# Patient Record
Sex: FEMALE | Race: WHITE | NOT HISPANIC OR LATINO | Employment: PART TIME | RURAL
[De-identification: names, ages, dates, MRNs, and addresses within clinical notes are randomized per-mention and may not be internally consistent; named-entity substitution may affect disease eponyms.]

---

## 2019-05-17 ENCOUNTER — OFFICE VISIT (OUTPATIENT)
Dept: FAMILY MEDICINE CLINIC | Facility: CLINIC | Age: 29
End: 2019-05-17

## 2019-05-17 VITALS
OXYGEN SATURATION: 100 % | DIASTOLIC BLOOD PRESSURE: 86 MMHG | WEIGHT: 222.4 LBS | BODY MASS INDEX: 35.74 KG/M2 | HEIGHT: 66 IN | SYSTOLIC BLOOD PRESSURE: 120 MMHG | RESPIRATION RATE: 18 BRPM | HEART RATE: 73 BPM

## 2019-05-17 DIAGNOSIS — N92.6 MISSED PERIOD: ICD-10-CM

## 2019-05-17 DIAGNOSIS — R10.2 ACUTE SUPRAPUBIC PAIN: Primary | ICD-10-CM

## 2019-05-17 DIAGNOSIS — Z32.01 POSITIVE URINE PREGNANCY TEST: ICD-10-CM

## 2019-05-17 LAB
B-HCG UR QL: POSITIVE
BILIRUB BLD-MCNC: ABNORMAL MG/DL
CLARITY, POC: CLEAR
COLOR UR: ABNORMAL
GLUCOSE UR STRIP-MCNC: NEGATIVE MG/DL
INTERNAL NEGATIVE CONTROL: NEGATIVE
INTERNAL POSITIVE CONTROL: POSITIVE
KETONES UR QL: ABNORMAL
LEUKOCYTE EST, POC: NEGATIVE
Lab: ABNORMAL
NITRITE UR-MCNC: NEGATIVE MG/ML
PH UR: 6 [PH] (ref 5–8)
PROT UR STRIP-MCNC: ABNORMAL MG/DL
RBC # UR STRIP: NEGATIVE /UL
SP GR UR: 1.02 (ref 1–1.03)
UROBILINOGEN UR QL: NORMAL

## 2019-05-17 PROCEDURE — 99202 OFFICE O/P NEW SF 15 MIN: CPT | Performed by: NURSE PRACTITIONER

## 2019-05-17 PROCEDURE — 81025 URINE PREGNANCY TEST: CPT | Performed by: NURSE PRACTITIONER

## 2019-05-17 RX ORDER — ONDANSETRON 8 MG/1
8 TABLET, ORALLY DISINTEGRATING ORAL EVERY 8 HOURS PRN
Qty: 20 TABLET | Refills: 0 | Status: SHIPPED | OUTPATIENT
Start: 2019-05-17 | End: 2019-08-05

## 2019-05-17 NOTE — PROGRESS NOTES
"Subjective   Emily Dean is a 29 y.o. female.     [facility] Visit    PCP: none listed    CC: \"stomach pain/vomiting\"    Reports she had Nexplanon removed about 2 months ago and bled until 4-15-19 and hasn't had a cycle since that time.  Not preventing pregnancy since Nexplanon was removed. Denies vaginal bleeding or discharge.     Did home pregnancy test 2 days ago that was positive.  Has appt with [facility], but not until .  Would like to see [facility].        Abdominal Pain   This is a new problem. Episode onset: x 5 days. The onset quality is gradual. The problem occurs constantly. The problem has been waxing and waning (worse when vomiting present). The pain is located in the suprapubic region (more on left side). The pain is at a severity of 8/10. The quality of the pain is cramping and aching. The abdominal pain does not radiate. Associated symptoms include nausea and vomiting. Pertinent negatives include no anorexia, arthralgias, belching, constipation, diarrhea, dysuria, fever, flatus, frequency, headaches, hematochezia, hematuria, melena, myalgias or weight loss. The pain is aggravated by vomiting. The pain is relieved by vomiting. She has tried nothing for the symptoms. Prior workup: none. Her past medical history is significant for abdominal surgery ( x 3).        The following portions of the patient's history were reviewed and updated as appropriate: allergies, current medications, past medical history, past social history, past surgical history and problem list.    Review of Systems   Constitutional: Negative for appetite change, diaphoresis, fever and unexpected weight loss.   Respiratory: Negative for cough, chest tightness, shortness of breath and wheezing.    Cardiovascular: Negative for chest pain and leg swelling.   Gastrointestinal: Positive for abdominal pain, nausea and vomiting. Negative for anorexia, constipation, diarrhea, flatus, hematochezia and melena. " "  Genitourinary: Negative for difficulty urinating, dysuria, frequency, hematuria, vaginal bleeding and vaginal discharge.   Musculoskeletal: Negative for arthralgias and myalgias.   Neurological: Negative for dizziness and headache.   Hematological: Negative for adenopathy.     /86 (BP Location: Left arm, Patient Position: Sitting, Cuff Size: Adult)   Pulse 73   Resp 18   Ht 167.6 cm (66\")   Wt 101 kg (222 lb 6.4 oz)   SpO2 100%   BMI 35.90 kg/m²     Objective   Physical Exam   Constitutional: She is oriented to person, place, and time. She appears well-developed and well-nourished. No distress.   Cardiovascular: Normal rate and regular rhythm.   Pulmonary/Chest: Effort normal and breath sounds normal. She has no wheezes. She has no rales.   Abdominal: Soft. Bowel sounds are normal. She exhibits no mass. There is tenderness ( TTP over left suprapubic area). There is no rebound and no guarding.   Genitourinary:   Genitourinary Comments: No flank pain     Neurological: She is alert and oriented to person, place, and time.   Nursing note and vitals reviewed.    Recent Results (from the past 24 hour(s))   POCT pregnancy, urine    Collection Time: 05/17/19 12:13 PM   Result Value Ref Range    HCG, Urine, QL Positive (A) Negative    Lot Number [id number]     Internal Positive Control Positive     Internal Negative Control Negative    POC Urinalysis Dipstick    Collection Time: 05/17/19 12:14 PM   Result Value Ref Range    Color Dark Yellow Yellow, Straw, Dark Yellow, Julienne    Clarity, UA Clear Clear    Glucose, UA Negative Negative, 1000 mg/dL (3+) mg/dL    Bilirubin Small (1+) (A) Negative    Ketones, UA Trace (A) Negative    Specific Gravity  1.025 1.005 - 1.030    Blood, UA Negative Negative    pH, Urine 6.0 5.0 - 8.0    Protein, POC 30 mg/dL (A) Negative mg/dL    Urobilinogen, UA Normal Normal    Leukocytes Negative Negative    Nitrite, UA Negative Negative     No Images in the past 120 days " found..      Assessment/Plan   Emily was seen today for abdominal pain and vomiting.    Diagnoses and all orders for this visit:    Acute suprapubic pain  -     POC Urinalysis Dipstick  -     POCT pregnancy, urine  -     US Ob Transvaginal; Future  -     Ambulatory Referral to Obstetrics / Gynecology    Missed period  -     POC Urinalysis Dipstick  -     POCT pregnancy, urine    Positive urine pregnancy test  -     ondansetron ODT (ZOFRAN ODT) 8 MG disintegrating tablet; Take 1 tablet by mouth Every 8 (Eight) Hours As Needed for Nausea or Vomiting.  -     US Ob Transvaginal; Future  -     Ambulatory Referral to Obstetrics / Gynecology      Was able to get patient in for u/s today to r/o ectopic pregnancy--will contact patient with results when available    Patient already taking Prenatal vitamin and advised to continue   Push fluids, stay hydrated  Eat frequent, small meals.   Rx for Zofran ODT for n/v as needed until she sees OB  Referral to see OB at [facility]    ER if worsening abdominal pain over the weekend    Body mass index is 35.9 kg/m².--positive pregnancy test--can discuss ideal weight during pregnancy with OB    Declines RTW note at this time

## 2019-05-21 ENCOUNTER — TELEPHONE (OUTPATIENT)
Dept: FAMILY MEDICINE CLINIC | Facility: CLINIC | Age: 29
End: 2019-05-21

## 2019-05-22 ENCOUNTER — DOCUMENTATION (OUTPATIENT)
Dept: FAMILY MEDICINE CLINIC | Facility: CLINIC | Age: 29
End: 2019-05-22

## 2019-05-22 DIAGNOSIS — R10.2 ACUTE SUPRAPUBIC PAIN: ICD-10-CM

## 2019-05-22 DIAGNOSIS — Z32.01 POSITIVE URINE PREGNANCY TEST: ICD-10-CM

## 2019-05-22 NOTE — PROGRESS NOTES
US results reviewed and patient notified. Small gestational sac noted with recommended f/u in 2 weeks for continued viability and to establish gestational age.     Patient has decided she would like to see OB at [facility].  We will get appt scheduled and notify patient.

## 2019-06-05 DIAGNOSIS — Z32.01 POSITIVE URINE PREGNANCY TEST: ICD-10-CM

## 2019-06-05 DIAGNOSIS — O21.9 VOMITING OR NAUSEA OF PREGNANCY: Primary | ICD-10-CM

## 2019-06-05 RX ORDER — ONDANSETRON 8 MG/1
TABLET, ORALLY DISINTEGRATING ORAL
Qty: 20 TABLET | Refills: 0 | OUTPATIENT
Start: 2019-06-05

## 2019-06-05 RX ORDER — DIPHENHYDRAMINE HYDROCHLORIDE 25 MG/1
25 CAPSULE ORAL EVERY 8 HOURS PRN
Qty: 30 TABLET | Refills: 0 | Status: SHIPPED | OUTPATIENT
Start: 2019-06-05 | End: 2019-07-05

## 2019-06-19 ENCOUNTER — INITIAL PRENATAL (OUTPATIENT)
Dept: OBSTETRICS AND GYNECOLOGY | Facility: CLINIC | Age: 29
End: 2019-06-19

## 2019-06-19 ENCOUNTER — APPOINTMENT (OUTPATIENT)
Dept: LAB | Facility: HOSPITAL | Age: 29
End: 2019-06-19

## 2019-06-19 VITALS — SYSTOLIC BLOOD PRESSURE: 134 MMHG | DIASTOLIC BLOOD PRESSURE: 58 MMHG | BODY MASS INDEX: 35.99 KG/M2 | WEIGHT: 223 LBS

## 2019-06-19 VITALS — DIASTOLIC BLOOD PRESSURE: 76 MMHG | SYSTOLIC BLOOD PRESSURE: 118 MMHG

## 2019-06-19 DIAGNOSIS — M54.9: ICD-10-CM

## 2019-06-19 DIAGNOSIS — Z3A.10 10 WEEKS GESTATION OF PREGNANCY: Primary | ICD-10-CM

## 2019-06-19 DIAGNOSIS — Z36.87 ENCOUNTER FOR ANTENATAL SCREENING FOR UNCERTAIN DATES: ICD-10-CM

## 2019-06-19 DIAGNOSIS — O26.891: ICD-10-CM

## 2019-06-19 DIAGNOSIS — Z34.81 PRENATAL CARE, SUBSEQUENT PREGNANCY IN FIRST TRIMESTER: Primary | ICD-10-CM

## 2019-06-19 DIAGNOSIS — Z87.51 HISTORY OF PRETERM LABOR: ICD-10-CM

## 2019-06-19 DIAGNOSIS — O34.219 HISTORY OF CESAREAN DELIVERY, CURRENTLY PREGNANT: ICD-10-CM

## 2019-06-19 DIAGNOSIS — Z98.891 HISTORY OF C-SECTION: ICD-10-CM

## 2019-06-19 DIAGNOSIS — Z82.79 FAMILY HISTORY OF CONGENITAL HYDROCEPHALUS: ICD-10-CM

## 2019-06-19 DIAGNOSIS — Z87.74 HISTORY OF CONGENITAL HEART DEFECT: ICD-10-CM

## 2019-06-19 DIAGNOSIS — O09.891 HISTORY OF PRETERM DELIVERY, CURRENTLY PREGNANT IN FIRST TRIMESTER: ICD-10-CM

## 2019-06-19 DIAGNOSIS — O21.9 NAUSEA AND VOMITING DURING PREGNANCY PRIOR TO 22 WEEKS GESTATION: ICD-10-CM

## 2019-06-19 PROBLEM — R10.2 ACUTE SUPRAPUBIC PAIN: Status: RESOLVED | Noted: 2019-05-17 | Resolved: 2019-06-19

## 2019-06-19 PROBLEM — Z32.01 POSITIVE URINE PREGNANCY TEST: Status: RESOLVED | Noted: 2019-05-17 | Resolved: 2019-06-19

## 2019-06-19 LAB
ABO GROUP BLD: NORMAL
AMPHET+METHAMPHET UR QL: NEGATIVE
BARBITURATES UR QL SCN: NEGATIVE
BASOPHILS # BLD AUTO: 0.03 10*3/MM3 (ref 0–0.2)
BASOPHILS NFR BLD AUTO: 0.2 % (ref 0–1.5)
BENZODIAZ UR QL SCN: NEGATIVE
BILIRUB UR QL STRIP: NEGATIVE
BLD GP AB SCN SERPL QL: NEGATIVE
CANNABINOIDS SERPL QL: NEGATIVE
CLARITY UR: CLEAR
COCAINE UR QL: NEGATIVE
COLOR UR: YELLOW
DEPRECATED RDW RBC AUTO: 42.1 FL (ref 37–54)
EOSINOPHIL # BLD AUTO: 0.09 10*3/MM3 (ref 0–0.4)
EOSINOPHIL NFR BLD AUTO: 0.7 % (ref 0.3–6.2)
ERYTHROCYTE [DISTWIDTH] IN BLOOD BY AUTOMATED COUNT: 13.5 % (ref 12.3–15.4)
GLUCOSE UR STRIP-MCNC: NEGATIVE MG/DL
HBV SURFACE AG SERPL QL IA: NORMAL
HCT VFR BLD AUTO: 39 % (ref 34–46.6)
HCV AB SER DONR QL: NORMAL
HGB BLD-MCNC: 13.1 G/DL (ref 12–15.9)
HGB UR QL STRIP.AUTO: NEGATIVE
HIV1+2 AB SER QL: NORMAL
IMM GRANULOCYTES # BLD AUTO: 0.04 10*3/MM3 (ref 0–0.05)
IMM GRANULOCYTES NFR BLD AUTO: 0.3 % (ref 0–0.5)
KETONES UR QL STRIP: NEGATIVE
LEUKOCYTE ESTERASE UR QL STRIP.AUTO: NEGATIVE
LYMPHOCYTES # BLD AUTO: 1.96 10*3/MM3 (ref 0.7–3.1)
LYMPHOCYTES NFR BLD AUTO: 16.2 % (ref 19.6–45.3)
Lab: NORMAL
MCH RBC QN AUTO: 28.9 PG (ref 26.6–33)
MCHC RBC AUTO-ENTMCNC: 33.6 G/DL (ref 31.5–35.7)
MCV RBC AUTO: 85.9 FL (ref 79–97)
METHADONE UR QL SCN: NEGATIVE
MONOCYTES # BLD AUTO: 0.67 10*3/MM3 (ref 0.1–0.9)
MONOCYTES NFR BLD AUTO: 5.5 % (ref 5–12)
NEUTROPHILS # BLD AUTO: 9.34 10*3/MM3 (ref 1.7–7)
NEUTROPHILS NFR BLD AUTO: 77.1 % (ref 42.7–76)
NITRITE UR QL STRIP: NEGATIVE
NRBC BLD AUTO-RTO: 0 /100 WBC (ref 0–0.2)
OPIATES UR QL: NEGATIVE
OXYCODONE UR QL SCN: NEGATIVE
PH UR STRIP.AUTO: 5.5 [PH] (ref 5–8)
PLATELET # BLD AUTO: 256 10*3/MM3 (ref 140–450)
PMV BLD AUTO: 10.7 FL (ref 6–12)
PROT UR QL STRIP: NEGATIVE
RBC # BLD AUTO: 4.54 10*6/MM3 (ref 3.77–5.28)
RH BLD: POSITIVE
RUBV IGG SERPL IA-ACNC: POSITIVE
SP GR UR STRIP: 1.03 (ref 1–1.03)
UROBILINOGEN UR QL STRIP: NORMAL
WBC NRBC COR # BLD: 12.13 10*3/MM3 (ref 3.4–10.8)

## 2019-06-19 PROCEDURE — 80307 DRUG TEST PRSMV CHEM ANLYZR: CPT | Performed by: NURSE PRACTITIONER

## 2019-06-19 PROCEDURE — 86803 HEPATITIS C AB TEST: CPT | Performed by: NURSE PRACTITIONER

## 2019-06-19 PROCEDURE — 86695 HERPES SIMPLEX TYPE 1 TEST: CPT | Performed by: NURSE PRACTITIONER

## 2019-06-19 PROCEDURE — 36415 COLL VENOUS BLD VENIPUNCTURE: CPT

## 2019-06-19 PROCEDURE — 87661 TRICHOMONAS VAGINALIS AMPLIF: CPT | Performed by: NURSE PRACTITIONER

## 2019-06-19 PROCEDURE — 87086 URINE CULTURE/COLONY COUNT: CPT | Performed by: NURSE PRACTITIONER

## 2019-06-19 PROCEDURE — 99214 OFFICE O/P EST MOD 30 MIN: CPT | Performed by: NURSE PRACTITIONER

## 2019-06-19 PROCEDURE — 80081 OBSTETRIC PANEL INC HIV TSTG: CPT | Performed by: NURSE PRACTITIONER

## 2019-06-19 PROCEDURE — 86696 HERPES SIMPLEX TYPE 2 TEST: CPT | Performed by: NURSE PRACTITIONER

## 2019-06-19 PROCEDURE — 81003 URINALYSIS AUTO W/O SCOPE: CPT | Performed by: NURSE PRACTITIONER

## 2019-06-19 PROCEDURE — 86696 HERPES SIMPLEX TYPE 2 TEST: CPT

## 2019-06-19 PROCEDURE — 87491 CHLMYD TRACH DNA AMP PROBE: CPT | Performed by: NURSE PRACTITIONER

## 2019-06-19 PROCEDURE — 87591 N.GONORRHOEAE DNA AMP PROB: CPT | Performed by: NURSE PRACTITIONER

## 2019-06-19 RX ORDER — ONDANSETRON 4 MG/1
8 TABLET, FILM COATED ORAL EVERY 8 HOURS PRN
Qty: 30 TABLET | Refills: 1 | Status: SHIPPED | OUTPATIENT
Start: 2019-06-19 | End: 2019-07-05

## 2019-06-19 NOTE — PROGRESS NOTES
I spent approximately 60 minutes with the patient acquiring the health and history intake and discussing topics related to healthy lifestyle. This is her fourth pregnancy. She had a csection with her first child due to baby having hydrocephaly. The child  at 6 years of age. The other 2 csections were scheduled repeat csections. Her last baby was born at 35wks due to  labor and SROM. Release on information sheets were signed to get the reports. 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 The 1st trimester teaching was done with the patient. We discussed a healthy diet and exercise and what is recommended. She complains today of nausea, vomiting, and constipation. We discussed options. She states she had zofran but ran out of medicine. I also discussed Listeriosis and Toxoplasmosis and what fish to avoid due to high mercury levels. Informed patient not to be in hot tubs, saunas, or tanning beds. We discussed that spotting may occur after intercourse which is common, but if heavy bleeding like a period occurs to call the [facility] or hospital if clinic is closed.  I encouraged her to make an appointment with the dentist if she has not had a dental exam and cleaning in the last 6 months. I instructed the patient that alcohol, illicit drug use, and tobacco smoking should be avoided in pregnancy. The patient does not smoke but discussed the importance of avoiding second hand smoke. We discussed the hospital policy procedure if a patient has a positive urine drug screen at the time of admission to the hospital. She does not plan to breastfeed. We discussed the resources that is offered at the health departments. She is interested in WIC and HANDS. She filled out the health department referral form. I discussed lab tests will be done today. The Quad screen is discussed and informed patient it is offered at 15-20 weeks and is optional. A release is signed to  get her last pap smear from [facility].   I encouraged the patient to get the TDAP vaccine in the 3rd trimester. I told the patient that health departments are giving the TDAP vaccine to family members. She also complains of back pain. I told her about [facility]. I told her if she was interested in seeing Whitney to talk to the provider about an appointment.  All questions were answered at this time.

## 2019-06-20 LAB
BACTERIA SPEC AEROBE CULT: NO GROWTH
C TRACH RRNA CVX QL NAA+PROBE: NEGATIVE
HSV-2 IGG SUPPLEMENTAL TEST: POSITIVE
HSV1 IGG SER IA-ACNC: 4.09 INDEX (ref 0–0.9)
HSV2 IGG SER IA-ACNC: 3.62 INDEX (ref 0–0.9)
N GONORRHOEA RRNA SPEC QL NAA+PROBE: NEGATIVE
RPR SER QL: NORMAL
TRICHOMONAS VAGINALIS PCR: NEGATIVE

## 2019-06-20 NOTE — PROGRESS NOTES
CC: NOB visit, hx reviewed    Patient Active Problem List   Diagnosis   (none) - all problems resolved or deleted       HPI: Here for initial prenatal care visit.     Labs:   No results found for: HGBA1C  No results found for: GLUCOSE  Last Completed Pap Smear       Status Date      PAP SMEAR No completions recorded          Radiology: TVUS showed single intrauterine fetus, CRL 10w1d, CL 3.66 cm    Each of the above were reviewed and integrated into prenatal care plan.    P/E:  See Vitals flow sheet, manually re-checked BP in left arm    A/P: 29 y.o. #: 1, Date: , Sex: Male, Weight: 3884 g (8 lb 9 oz), GA: 40w0d, Delivery: , Unspecified, Apgar1: None, Apgar5: None, Living: , Birth Comments:  at age 6; scheduled csection due to hydrocephaly    #: 2, Date: 10/11/09, Sex: Female, Weight: 3289 g (7 lb 4 oz), GA: 40w0d, Delivery: , Unspecified, Apgar1: None, Apgar5: None, Living: Living, Birth Comments: None    #: 3, Date: 12/04/15, Sex: Female, Weight: 2608 g (5 lb 12 oz), GA: 35w0d, Delivery: , Unspecified, Apgar1: None, Apgar5: None, Living: Living, Birth Comments: labor onset, ruptured membranes, repeat csection    #: 4, Date: None, Sex: None, Weight: None, GA: 10w1d      1. Routine prenatal care   Encourage PNV   SAB precautions   NOB labs today     2.    Diagnosis Plan   1. 10 weeks gestation of pregnancy     2. Encounter for  screening for uncertain dates  US Ob Transvaginal   3. Nausea and vomiting during pregnancy prior to 22 weeks gestation  ondansetron (ZOFRAN) 4 MG tablet  Eat dry crackers before arising in am, small frequent protein filled snacks   4. History of  delivery, currently pregnant in first trimester  @ 35 weeks gestation   5. History of  delivery, currently pregnant  Desires RCS and BTL   6. History of congenital heart defect  Pt had VSD which required surgery as an infant    7. Family history of congenital hydrocephalus  Pt had  child with hydrocephaly whom  at 6 years old   8. Back pain during pregnancy in first trimester  Use good body mechanics, tylenol as needed for pain, warm compress, we can refer to PT-pt declines      RTC in 1 month for HENRIETTA serna

## 2019-06-24 DIAGNOSIS — Z36.87 ENCOUNTER FOR ANTENATAL SCREENING FOR UNCERTAIN DATES: ICD-10-CM

## 2019-07-02 DIAGNOSIS — O21.9 NAUSEA AND VOMITING DURING PREGNANCY PRIOR TO 22 WEEKS GESTATION: ICD-10-CM

## 2019-07-02 RX ORDER — ONDANSETRON 4 MG/1
TABLET, ORALLY DISINTEGRATING ORAL
Qty: 30 TABLET | OUTPATIENT
Start: 2019-07-02

## 2019-07-03 DIAGNOSIS — O20.9 BLEEDING IN EARLY PREGNANCY: Primary | ICD-10-CM

## 2019-07-05 ENCOUNTER — HOSPITAL ENCOUNTER (OUTPATIENT)
Dept: ULTRASOUND IMAGING | Facility: HOSPITAL | Age: 29
Discharge: HOME | End: 2019-07-05
Admitting: NURSE PRACTITIONER

## 2019-07-05 ENCOUNTER — APPOINTMENT (OUTPATIENT)
Dept: LAB | Facility: HOSPITAL | Age: 29
End: 2019-07-05

## 2019-07-05 ENCOUNTER — ROUTINE PRENATAL (OUTPATIENT)
Dept: OBSTETRICS AND GYNECOLOGY | Facility: CLINIC | Age: 29
End: 2019-07-05

## 2019-07-05 VITALS — DIASTOLIC BLOOD PRESSURE: 84 MMHG | BODY MASS INDEX: 36.28 KG/M2 | WEIGHT: 224.8 LBS | SYSTOLIC BLOOD PRESSURE: 118 MMHG

## 2019-07-05 DIAGNOSIS — O09.891 HISTORY OF PRETERM DELIVERY, CURRENTLY PREGNANT IN FIRST TRIMESTER: ICD-10-CM

## 2019-07-05 DIAGNOSIS — Z98.891 HISTORY OF 3 CESAREAN SECTIONS: ICD-10-CM

## 2019-07-05 DIAGNOSIS — Z3A.12 12 WEEKS GESTATION OF PREGNANCY: Primary | ICD-10-CM

## 2019-07-05 DIAGNOSIS — O20.9 BLEEDING IN EARLY PREGNANCY: ICD-10-CM

## 2019-07-05 DIAGNOSIS — Z87.74 HISTORY OF CONGENITAL HEART DEFECT: ICD-10-CM

## 2019-07-05 LAB — HCG INTACT+B SERPL-ACNC: NORMAL MIU/ML

## 2019-07-05 PROCEDURE — 76801 OB US < 14 WKS SINGLE FETUS: CPT

## 2019-07-05 PROCEDURE — 84702 CHORIONIC GONADOTROPIN TEST: CPT | Performed by: NURSE PRACTITIONER

## 2019-07-05 PROCEDURE — 99212 OFFICE O/P EST SF 10 MIN: CPT | Performed by: NURSE PRACTITIONER

## 2019-07-05 PROCEDURE — 36415 COLL VENOUS BLD VENIPUNCTURE: CPT | Performed by: NURSE PRACTITIONER

## 2019-07-05 RX ORDER — NITROFURANTOIN 25; 75 MG/1; MG/1
100 CAPSULE ORAL 2 TIMES DAILY
COMMUNITY
End: 2019-08-05

## 2019-07-05 NOTE — PROGRESS NOTES
"CC: bleeding in early pregnancy      Patient Active Problem List   Diagnosis   (none) - all problems resolved or deleted       HPI: Fourth pregnancy, history of PPROM in 2015, infant with hydrencephaly in -lived till 6 years old.    Pt reported she had \"three holes in my heart\" which required surgery when she was an infant.  It was confirmed through past medical records that patient has ASD and VSD, but she is asymptomatic.  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      ROS: Pt reports on 19 she had thick mucus blood tinged discharge followed by bright red vaginal bleeding so she went to [facility] ED because she thought she was miscarrying.  Light vaginal bleeding occurred two days following the event, but has since subsided.     Labs:   No results found for: HGBA1C  No results found for: GLUCOSE  Last Completed Pap Smear       Status Date      PAP SMEAR No completions recorded          Radiology: Reviewed preliminary viability scan report with pt- no hemorrhages or abnormalities seen     Each of the above were reviewed and integrated into prenatal care plan.    P/E:  See Vitals flow sheet    A/P: 29 y.o. #: 1, Date: , Sex: Male, Weight: 3884 g (8 lb 9 oz), GA: 40w0d, Delivery: , Unspecified, Apgar1: None, Apgar5: None, Living: , Birth Comments:  at age 6; scheduled csection due to hydrocephaly    #: 2, Date: 10/11/09, Sex: Female, Weight: 3289 g (7 lb 4 oz), GA: 40w0d, Delivery: , Unspecified, Apgar1: None, Apgar5: None, Living: Living, Birth Comments: None    #: 3, Date: 12/04/15, Sex: Female, Weight: 2608 g (5 lb 12 oz), GA: 35w0d, Delivery: , Unspecified, Apgar1: None, Apgar5: None, Living: Living, Birth Comments: labor onset, ruptured membranes, repeat csection    #: 4, Date: None, Sex: None, Weight: None, GA:12w3d      1. Routine prenatal care   Encourage PNV   SAB precautions   Pt may experience some vaginal spotting after " intercourse     2.    Diagnosis Plan   1. 12 weeks gestation of pregnancy     2. History of 3  sections     3. History of congenital heart defect  Current vsd and asd, asymptomatic    4. History of  delivery, currently pregnant in first trimester     5. HSV 2        RCS

## 2019-08-05 ENCOUNTER — APPOINTMENT (OUTPATIENT)
Dept: LAB | Facility: HOSPITAL | Age: 29
End: 2019-08-05

## 2019-08-05 ENCOUNTER — ROUTINE PRENATAL (OUTPATIENT)
Dept: OBSTETRICS AND GYNECOLOGY | Facility: CLINIC | Age: 29
End: 2019-08-05

## 2019-08-05 VITALS — DIASTOLIC BLOOD PRESSURE: 86 MMHG | WEIGHT: 227 LBS | BODY MASS INDEX: 36.64 KG/M2 | SYSTOLIC BLOOD PRESSURE: 126 MMHG

## 2019-08-05 DIAGNOSIS — Z36.89 ENCOUNTER FOR FETAL ANATOMIC SURVEY: ICD-10-CM

## 2019-08-05 DIAGNOSIS — Z3A.16 16 WEEKS GESTATION OF PREGNANCY: Primary | ICD-10-CM

## 2019-08-05 DIAGNOSIS — O34.219 HISTORY OF CESAREAN DELIVERY, CURRENTLY PREGNANT: ICD-10-CM

## 2019-08-05 DIAGNOSIS — Z36.9 ENCOUNTER FOR ANTENATAL SCREENING: ICD-10-CM

## 2019-08-05 DIAGNOSIS — O09.892 HISTORY OF PRETERM DELIVERY, CURRENTLY PREGNANT IN SECOND TRIMESTER: ICD-10-CM

## 2019-08-05 DIAGNOSIS — Z86.19 HISTORY OF HERPES SIMPLEX INFECTION: ICD-10-CM

## 2019-08-05 LAB
BILIRUB UR QL STRIP: NEGATIVE
CANDIDA ALBICANS: NEGATIVE
CLARITY UR: ABNORMAL
COLOR UR: YELLOW
GARDNERELLA VAGINALIS: NEGATIVE
GLUCOSE UR STRIP-MCNC: NEGATIVE MG/DL
HGB UR QL STRIP.AUTO: NEGATIVE
KETONES UR QL STRIP: NEGATIVE
LEUKOCYTE ESTERASE UR QL STRIP.AUTO: NEGATIVE
NITRITE UR QL STRIP: NEGATIVE
PH UR STRIP.AUTO: 6 [PH] (ref 5–8)
PROT UR QL STRIP: NEGATIVE
SP GR UR STRIP: 1.02 (ref 1–1.03)
T VAGINALIS DNA VAG QL PROBE+SIG AMP: NEGATIVE
UROBILINOGEN UR QL STRIP: ABNORMAL

## 2019-08-05 PROCEDURE — 81003 URINALYSIS AUTO W/O SCOPE: CPT | Performed by: NURSE PRACTITIONER

## 2019-08-05 PROCEDURE — 87480 CANDIDA DNA DIR PROBE: CPT | Performed by: NURSE PRACTITIONER

## 2019-08-05 PROCEDURE — 87086 URINE CULTURE/COLONY COUNT: CPT | Performed by: NURSE PRACTITIONER

## 2019-08-05 PROCEDURE — 87660 TRICHOMONAS VAGIN DIR PROBE: CPT | Performed by: NURSE PRACTITIONER

## 2019-08-05 PROCEDURE — 87510 GARDNER VAG DNA DIR PROBE: CPT | Performed by: NURSE PRACTITIONER

## 2019-08-05 PROCEDURE — 99213 OFFICE O/P EST LOW 20 MIN: CPT | Performed by: NURSE PRACTITIONER

## 2019-08-05 NOTE — PROGRESS NOTES
CC: HENRIETTA visit, hx reviewed     HPI: 29 y.o.   GA: 16w6d JUANITA: 2020, by Ultrasound  Here for follow up prenatal care.     ROS: Doing better, starts new job at [employer] next week which will be less strenuous and stressful.  Occasional vaginal spotting. Pt denies cramping or dysuria.      Labs:   No results found for: HGBA1C  No results found for: GLUCOSE  Last Completed Pap Smear       Status Date      PAP SMEAR No completions recorded          Each of the above were reviewed and integrated into prenatal care plan.    P/E:  See Vitals flow sheet      1. Routine prenatal care   Encourage PNV   PTL precautions   Pt prefers appointments to be in [place] since its closer to home  2.    Diagnosis Plan   1. 16 weeks gestation of pregnancy     2. History of  delivery, currently pregnant  Desires RCS and BTL   3. History of herpes simplex infection  Prophylactic valtrex @ 35 weeks   4. History of  delivery, currently pregnant in second trimester  35 weeks in previous pregnancy   5. Encounter for  screening  Gardnerella vaginalis, Trichomonas vaginalis, Candida albicans, DNA - Swab, Vagina    Urinalysis without microscopic (no culture) - Urine, Clean Catch    Urine Culture - Urine, Urine, Clean Catch   6. Encounter for fetal anatomic survey  US Ob Detail Fetal Anatomy Single or First Gestation  Personal history of PDA, VSD, and ASD.  Plan on fetal echo following anatomy scan.       RTC in [place] in 3 weeks for HENRIETTA appt and anatomy scan

## 2019-08-06 LAB — BACTERIA SPEC AEROBE CULT: NO GROWTH

## 2019-08-26 ENCOUNTER — ROUTINE PRENATAL (OUTPATIENT)
Dept: OBSTETRICS AND GYNECOLOGY | Facility: CLINIC | Age: 29
End: 2019-08-26

## 2019-08-26 VITALS — WEIGHT: 230 LBS | SYSTOLIC BLOOD PRESSURE: 119 MMHG | DIASTOLIC BLOOD PRESSURE: 76 MMHG | BODY MASS INDEX: 37.12 KG/M2

## 2019-08-26 DIAGNOSIS — O99.891 MATERNAL CONGENITAL CARDIAC ANOMALY AFFECTING PREGNANCY, ANTEPARTUM, SECOND TRIMESTER: Primary | ICD-10-CM

## 2019-08-26 DIAGNOSIS — Q24.9 MATERNAL CONGENITAL CARDIAC ANOMALY AFFECTING PREGNANCY, ANTEPARTUM, SECOND TRIMESTER: Primary | ICD-10-CM

## 2019-08-26 DIAGNOSIS — IMO0002 EVALUATE ANATOMY NOT SEEN ON PRIOR SONOGRAM: ICD-10-CM

## 2019-08-26 DIAGNOSIS — O34.211 ENCOUNTER FOR MATERNAL CARE FOR LOW TRANSVERSE SCAR FROM PREVIOUS CESAREAN DELIVERY: ICD-10-CM

## 2019-08-26 DIAGNOSIS — Z82.79 FAMILY HISTORY OF CONGENITAL HYDROCEPHALUS: ICD-10-CM

## 2019-08-26 DIAGNOSIS — O43.92 DISORDER OF PLACENTA IN SECOND TRIMESTER: ICD-10-CM

## 2019-08-26 DIAGNOSIS — Z3A.19 19 WEEKS GESTATION OF PREGNANCY: ICD-10-CM

## 2019-08-26 PROCEDURE — 99214 OFFICE O/P EST MOD 30 MIN: CPT | Performed by: NURSE PRACTITIONER

## 2019-08-30 DIAGNOSIS — Z36.89 ENCOUNTER FOR FETAL ANATOMIC SURVEY: ICD-10-CM

## 2019-09-16 ENCOUNTER — ROUTINE PRENATAL (OUTPATIENT)
Dept: OBSTETRICS AND GYNECOLOGY | Facility: CLINIC | Age: 29
End: 2019-09-16

## 2019-09-16 VITALS — DIASTOLIC BLOOD PRESSURE: 78 MMHG | WEIGHT: 242 LBS | BODY MASS INDEX: 39.06 KG/M2 | SYSTOLIC BLOOD PRESSURE: 124 MMHG

## 2019-09-16 DIAGNOSIS — O34.211 ENCOUNTER FOR MATERNAL CARE FOR LOW TRANSVERSE SCAR FROM PREVIOUS CESAREAN DELIVERY: ICD-10-CM

## 2019-09-16 DIAGNOSIS — O99.891 MATERNAL CONGENITAL CARDIAC ANOMALY AFFECTING PREGNANCY, ANTEPARTUM, SECOND TRIMESTER: Primary | ICD-10-CM

## 2019-09-16 DIAGNOSIS — R00.2 HEART PALPITATIONS: ICD-10-CM

## 2019-09-16 DIAGNOSIS — Z82.79 FAMILY HISTORY OF CONGENITAL HYDROCEPHALUS: ICD-10-CM

## 2019-09-16 DIAGNOSIS — Q24.9 MATERNAL CONGENITAL CARDIAC ANOMALY AFFECTING PREGNANCY, ANTEPARTUM, SECOND TRIMESTER: Primary | ICD-10-CM

## 2019-09-16 DIAGNOSIS — I83.813 VARICOSE VEINS OF BOTH LOWER EXTREMITIES WITH PAIN: ICD-10-CM

## 2019-09-16 DIAGNOSIS — Z36.89 ENCOUNTER FOR OTHER SPECIFIED ANTENATAL SCREENING: ICD-10-CM

## 2019-09-16 DIAGNOSIS — Z3A.22 22 WEEKS GESTATION OF PREGNANCY: ICD-10-CM

## 2019-09-16 DIAGNOSIS — O43.92 DISORDER OF PLACENTA IN SECOND TRIMESTER: ICD-10-CM

## 2019-09-16 PROCEDURE — 99214 OFFICE O/P EST MOD 30 MIN: CPT | Performed by: NURSE PRACTITIONER

## 2019-09-16 RX ORDER — RANITIDINE 150 MG/1
150 TABLET ORAL NIGHTLY
Qty: 30 TABLET | Refills: 4 | Status: SHIPPED | OUTPATIENT
Start: 2019-09-16 | End: 2019-12-05

## 2019-09-16 NOTE — PROGRESS NOTES
CC: Prenatal visit; hx reviewed and updated    Emily Dean is a 29 y.o.  at 22w6d.  C/O chest pain and palpitations that wake her from sleep. Partner has noticed that it is most frequent after she has had a midnight snack but she states that the pain is severe and very scary for her due to her cardiac history. Denies dysuria, vb, LOF, decreased FM, abnormal vaginal d/c, headaches or constipation.     /78   Wt 110 kg (242 lb)   LMP 2019   BMI 39.06 kg/m²   Reviewed anatomy u/s f/u: EFW 1lb 5oz, LALA WNL. Placental lake still present, measuring 4.05 x 1.27 x 4.46cm. All suboptimal fetal anatomy was seen today and all noted to appear normal at this time. CL not measured today.     Problems (from 19 to present)     Problem Noted Resolved    Varicose veins of both lower extremities with pain 2019 by Deena Manzanares APRN No    Overview Signed 2019 11:26 AM by Deena Manzanares APRN     Encouraged compression stockings or tighter fitting leggings.  Referral to cardio.         Heart palpitations 2019 by Deena Manzanares APRN No    Overview Signed 2019 11:26 AM by Deena Manzanares APRN     H/o ASD/VSD/PDA sx as infant. Referral to cardio.         Maternal congenital cardiac anomaly affecting pregnancy, antepartum, second trimester 2019 by Deena Manzanares APRN No    Overview Addendum 2019 11:27 AM by Deena Manzanares APRN     ASD/VSD/PDA sx as infant         Disorder of placenta in second trimester 2019 by Deena Manzanares APRN No    Overview Signed 2019 12:45 PM by Deena Manzanares APRN     Placental Henson  19 measurements: 4.05 x 1.27 x 4.46cm.         Family history of congenital hydrocephalus 2019 by Deena Manzanares APRN No    Overview Addendum 2019 11:28 AM by Deena Manzanares APRN     Pt's son born in . He  at age 6.         Encounter for maternal care for low transverse scar from previous  delivery 2019 by Deena Manzanares  RISHI, CHONG No    Overview Signed 2019 12:47 PM by Deena Manzanares, APRN     x3  Plans for RLTCS with BTL               A/P: Emily Dean is a 29 y.o.  at 22w6d.  - RTC in 4 weeks  - MFM consult and fetal echo in 1 week with GERALD Zepeda.     Diagnosis Plan   1. Maternal congenital cardiac anomaly affecting pregnancy, antepartum, second trimester  Ambulatory Referral to Cardiology    US Fetal Echo    US Ob Follow Up Transabdominal Approach   2. Disorder of placenta in second trimester     3. Family history of congenital hydrocephalus  US Fetal Echo    US Ob Follow Up Transabdominal Approach   4. Encounter for maternal care for low transverse scar from previous  delivery     5. Heart palpitations  Ambulatory Referral to Cardiology    Trying to differentiate b/t palpitations and heartburn. So will add zantac at bedtime. If she is not seeing any relief after 2 weeks of use then she can stop taking it.   6. Varicose veins of both lower extremities with pain  Ambulatory Referral to Cardiology   7. Encounter for other specified  screening  CBC (No Diff)    Glucose, Post 50 Gm Glucola   8. 22 weeks gestation of pregnancy         CHONG Perez  2019  11:37 AM

## 2019-09-18 DIAGNOSIS — O99.891 MATERNAL CONGENITAL CARDIAC ANOMALY AFFECTING PREGNANCY, ANTEPARTUM, SECOND TRIMESTER: Primary | ICD-10-CM

## 2019-09-18 DIAGNOSIS — Q24.9 MATERNAL CONGENITAL CARDIAC ANOMALY AFFECTING PREGNANCY, ANTEPARTUM, SECOND TRIMESTER: Primary | ICD-10-CM

## 2019-09-23 DIAGNOSIS — Q24.9 MATERNAL CONGENITAL CARDIAC ANOMALY AFFECTING PREGNANCY, ANTEPARTUM, SECOND TRIMESTER: ICD-10-CM

## 2019-09-23 DIAGNOSIS — O99.891 MATERNAL CONGENITAL CARDIAC ANOMALY AFFECTING PREGNANCY, ANTEPARTUM, SECOND TRIMESTER: ICD-10-CM

## 2019-10-17 DIAGNOSIS — R00.2 HEART PALPITATIONS: Primary | ICD-10-CM

## 2019-10-18 ENCOUNTER — LAB (OUTPATIENT)
Dept: LAB | Facility: HOSPITAL | Age: 29
End: 2019-10-18

## 2019-10-18 ENCOUNTER — HOSPITAL ENCOUNTER (OUTPATIENT)
Dept: ULTRASOUND IMAGING | Facility: HOSPITAL | Age: 29
Discharge: HOME | End: 2019-10-18
Admitting: INTERNAL MEDICINE

## 2019-10-18 ENCOUNTER — OFFICE VISIT (OUTPATIENT)
Dept: CARDIOLOGY | Facility: CLINIC | Age: 29
End: 2019-10-18

## 2019-10-18 VITALS
HEIGHT: 66 IN | DIASTOLIC BLOOD PRESSURE: 74 MMHG | OXYGEN SATURATION: 98 % | SYSTOLIC BLOOD PRESSURE: 122 MMHG | HEART RATE: 78 BPM | BODY MASS INDEX: 38.89 KG/M2 | WEIGHT: 242 LBS

## 2019-10-18 DIAGNOSIS — M79.89 LOCALIZED SWELLING OF LOWER EXTREMITY: ICD-10-CM

## 2019-10-18 DIAGNOSIS — R00.2 HEART PALPITATIONS: Primary | ICD-10-CM

## 2019-10-18 DIAGNOSIS — Q28.9 CONGENITAL ANOMALY OF CARDIOVASCULAR SYSTEM: ICD-10-CM

## 2019-10-18 DIAGNOSIS — R00.2 HEART PALPITATIONS: ICD-10-CM

## 2019-10-18 LAB
ALBUMIN SERPL-MCNC: 3.8 G/DL (ref 3.5–5.2)
ALBUMIN/GLOB SERPL: 1.3 G/DL
ALP SERPL-CCNC: 105 U/L (ref 39–117)
ALT SERPL W P-5'-P-CCNC: 9 U/L (ref 1–33)
ANION GAP SERPL CALCULATED.3IONS-SCNC: 7.2 MMOL/L (ref 5–15)
AST SERPL-CCNC: 12 U/L (ref 1–32)
BILIRUB SERPL-MCNC: 0.3 MG/DL (ref 0.2–1.2)
BUN BLD-MCNC: 7 MG/DL (ref 6–20)
BUN/CREAT SERPL: 12.5 (ref 7–25)
CALCIUM SPEC-SCNC: 8.6 MG/DL (ref 8.6–10.5)
CHLORIDE SERPL-SCNC: 102 MMOL/L (ref 98–107)
CO2 SERPL-SCNC: 26.8 MMOL/L (ref 22–29)
CREAT BLD-MCNC: 0.56 MG/DL (ref 0.57–1)
GFR SERPL CREATININE-BSD FRML MDRD: 128 ML/MIN/1.73
GLOBULIN UR ELPH-MCNC: 3 GM/DL
GLUCOSE BLD-MCNC: 92 MG/DL (ref 65–99)
MAGNESIUM SERPL-MCNC: 1.9 MG/DL (ref 1.6–2.6)
POTASSIUM BLD-SCNC: 4.5 MMOL/L (ref 3.5–5.2)
PROT SERPL-MCNC: 6.8 G/DL (ref 6–8.5)
SODIUM BLD-SCNC: 136 MMOL/L (ref 136–145)
TSH SERPL DL<=0.05 MIU/L-ACNC: 2.8 UIU/ML (ref 0.27–4.2)

## 2019-10-18 PROCEDURE — 36415 COLL VENOUS BLD VENIPUNCTURE: CPT

## 2019-10-18 PROCEDURE — 93000 ELECTROCARDIOGRAM COMPLETE: CPT | Performed by: INTERNAL MEDICINE

## 2019-10-18 PROCEDURE — 99205 OFFICE O/P NEW HI 60 MIN: CPT | Performed by: INTERNAL MEDICINE

## 2019-10-18 PROCEDURE — 80053 COMPREHEN METABOLIC PANEL: CPT

## 2019-10-18 PROCEDURE — 84443 ASSAY THYROID STIM HORMONE: CPT

## 2019-10-18 PROCEDURE — 83735 ASSAY OF MAGNESIUM: CPT

## 2019-10-18 PROCEDURE — 93970 EXTREMITY STUDY: CPT

## 2019-10-18 NOTE — PROGRESS NOTES
[facility] Cardiology  OFFICE NOTE    Cardiovascular Medicine  Rosa John M.D., RPVI         Deena Manzanares, APRN  [address] [place] [address] [address]  [place] [address]    Thank you for asking me to see Emily Dean for palpitations.    History of Present Illness  This is a 29 y.o. female with:    1.  History of ASD and VSD unrepaired line   2. 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  Currently pregnant  4.  Palpitations   5 varicose veins    Emily Dean is a 29 y.o. female who presents for consultation today.  She is here for establishment of care.  Patient reported that she has a history of PDA which was repaired when she was 2 years old.  She also had a history of ASD VSD which are supposedly small in size and has not been repaired and patient had been advised for medical management.  Is currently pregnant with her fourth child and is 29 weeks pregnant.  Since last several months she has noticed occasional palpitations, these episodes last for several seconds, feels like fullness in her throat and butterflies in her chest can occur at any time however most of them have been when she was laying down, one episode occurred when she was driving.  Always associated with exertion.  No associated dizziness or lightheadedness.  She reported some of these episodes were worse when she had late nights next which she cut back and had improvement in her symptoms.  Also concerned about her varicose veins on the right side.  Reported that they are tender to touch and to touch at times.  Reported she previously had an ultrasound of the varicose veins and reported without any clots.  Her varicose veins do get worse with pregnancy.  No ulceration.  No other acute complaints.        Review of Systems - ROS  Constitution: Negative for weakness, weight gain and weight loss.   HENT: Negative for congestion.    Eyes: Negative for blurred vision.   Cardiovascular: As mentioned  "above  Respiratory: Negative for cough and hemoptysis.    Endocrine: Negative for polydipsia and polyuria.   Hematologic/Lymphatic: Negative for bleeding problem. Does not bruise/bleed easily.   Skin: Negative for flushing.   Musculoskeletal: Negative for neck pain and stiffness.   Gastrointestinal: Negative for abdominal pain, diarrhea, jaundice, melena, nausea and vomiting.   Genitourinary: Negative for dysuria and hematuria.   Neurological: Negative for dizziness, focal weakness and numbness.   Psychiatric/Behavioral: Negative for altered mental status and depression.          All other systems were reviewed and were negative.    family history includes ADD / ADHD in her daughter; COPD in her mother; Cancer in her mother; Heart attack in her father and maternal grandmother; Hydrocephalus in her son; Lung cancer in her maternal grandfather; No Known Problems in her daughter and sister.     reports that she has never smoked. She has never used smokeless tobacco. She reports that she does not drink alcohol or use drugs.    No Known Allergies      Current Outpatient Medications:   •  PRENATAL GUMMY VITAMIN, Chew 1 each Daily., Disp: , Rfl:   •  raNITIdine (ZANTAC) 150 MG tablet, Take 1 tablet by mouth Every Night., Disp: 30 tablet, Rfl: 4    Physical Exam:  Vitals:    10/18/19 0833 10/18/19 0835   BP: 126/76 122/74   BP Location: Left arm Right arm   Patient Position: Sitting Sitting   Cuff Size: Adult Adult   Pulse: 78    SpO2: 98%    Weight: 110 kg (242 lb)    Height: 167.6 cm (66\")    PainSc: 0-No pain      Current Pain Level: none  Pulse Ox: Normal  on room air  General: alert, appears stated age and cooperative     Body Habitus: well-nourished    HEENT: Head: Normocephalic, no lesions, without obvious abnormality. No arcus senilis, xanthelasma or xanthomas.    Neuro: alert, oriented x3  Pulses: 2+ and symmetric  JVP: Volume/Pulsation: Normal.  Normal waveforms.   Appropriate inspiratory decrease.  No Kussmaul's. " No Annika's.   Carotid Exam: no bruit normal pulsation bilaterally   Carotid Volume: normal.     Respirations: no increased work of breathing   Chest:  Normal    Pulmonary:Normal   Precordium: Normal impulses. P2 is not palpable.  RV Heave: absent  LV Heave: absent  Amelia:  normal size and placement  Palpable S4: absent.  Heart rate: normal    Heart Rhythm: regular   Pan systolic murmur audible.    Heart Sounds: S1: normal  S2: normal  S3: absent   S4: absent  Opening Snap: absent    Pericardial Rub:  Absent: .    Abdomen:   Appearance: normal .  Palpation: Soft, non-tender to palpation, bowel sounds positive in all four quadrants; no guarding or rebound tenderness  Extremity: no edema.   LE Skin: no rashes  LE Hair:  normal  LE Pulses: well perfused with normal pulses in the distal extremities  Pallor on elevation: Absent. Rubor on dependency: None      DATA REVIEWED:     EKG. I personally reviewed and interpreted the EKG.  Normal sinus rhythm.  Possible left atrial enlargement    ECG/EMG Results (all)     None        ---------------------------------------------------  TTE/EDDIE:     ---------        --------------------------------------------------------------------------------------------------  LABS:     The CVD Risk score (TOMMY'Agostino, et al., 2008) failed to calculate for the following reasons:    The 2008 CVD risk score is only valid for ages 30 to 74         No results found for: GLUCOSE, BUN, CREATININE, EGFRIFNONA, EGFRIFAFRI, BCR, K, CO2, CALCIUM, PROTENTOTREF, ALBUMIN, LABIL2, AST, ALT  Lab Results   Component Value Date    WBC 12.13 (H) 06/19/2019    HGB 13.1 06/19/2019    HCT 39.0 06/19/2019    MCV 85.9 06/19/2019     06/19/2019     No results found for: CHOL, CHLPL, TRIG, HDL, LDL, LDLDIRECT  No results found for: TSH, F6VPWIR, V1WFFBT, THYROIDAB  No results found for: CKTOTAL, CKMB, CKMBINDEX, TROPONINI, TROPONINT  No results found for: HGBA1C  No results found for: DDIMER  No results found  for: ALT  No results found for: HGBA1C  No results found for: GLUF, MICROALBUR, CREATININE  No results found for: IRON, TIBC, FERRITIN  No results found for: INR, PROTIME    Assessment/Plan     1. Heart palpitations  EKG in office with normal sinus rhythm without any evidence of preexcitation, QT prolongation.  Also left atrial enlargement.  We will check TSH, electrolytes and magnesium.  We will plan performing a ZIO Patch for further assessment of her episodes  We will get an echocardiogram to assess for any structural abnormalities and assess her EST VSD  - Holter Monitor - 72 Hour Up To 21 Days; Future  - TSH; Future  - Comprehensive Metabolic Panel; Future  - Magnesium; Future  - Adult Transthoracic Echo Complete W/ Cont if Necessary Per Protocol; Future    2. Localized swelling of lower extremity  Known history of varicose veins.  Patient is concerned about potential clots in her varicose veins.  Plan performing venous ultrasound to rule thrombus varicose veins.      3. Congenital anomaly of cardiovascular system  History of PDA as a childhood which was repaired.  Also has history of ASD VSD which has been reported small in size and has been recommended for medical management.  I will get an echocardiogram for further assessment.  Only systolic murmur audible and she has been advised for antibiotic prophylaxis previously.      Prevention:  Patient's Body mass index is 39.06 kg/m². BMI is above normal parameters. Recommendations include: exercise counseling and nutrition counseling.      Emily Dean is not a smoker      AAA Screening:   Not needed    Return in about 6 weeks (around 11/29/2019).          This document has been electronically signed by Rosa John MD on October 18, 2019 8:58 AM

## 2019-10-21 ENCOUNTER — TELEPHONE (OUTPATIENT)
Dept: CARDIOLOGY | Facility: CLINIC | Age: 29
End: 2019-10-21

## 2019-10-21 ENCOUNTER — ROUTINE PRENATAL (OUTPATIENT)
Dept: OBSTETRICS AND GYNECOLOGY | Facility: CLINIC | Age: 29
End: 2019-10-21

## 2019-10-21 VITALS — SYSTOLIC BLOOD PRESSURE: 118 MMHG | DIASTOLIC BLOOD PRESSURE: 70 MMHG | WEIGHT: 247 LBS | BODY MASS INDEX: 39.87 KG/M2

## 2019-10-21 DIAGNOSIS — O34.211 ENCOUNTER FOR MATERNAL CARE FOR LOW TRANSVERSE SCAR FROM PREVIOUS CESAREAN DELIVERY: Primary | ICD-10-CM

## 2019-10-21 DIAGNOSIS — Q24.9 MATERNAL CONGENITAL CARDIAC ANOMALY AFFECTING PREGNANCY, ANTEPARTUM, SECOND TRIMESTER: ICD-10-CM

## 2019-10-21 DIAGNOSIS — R00.2 HEART PALPITATIONS: ICD-10-CM

## 2019-10-21 DIAGNOSIS — Z23 NEED FOR IMMUNIZATION AGAINST INFLUENZA: ICD-10-CM

## 2019-10-21 DIAGNOSIS — Z82.79 FAMILY HISTORY OF CONGENITAL HYDROCEPHALUS: ICD-10-CM

## 2019-10-21 DIAGNOSIS — O43.92 DISORDER OF PLACENTA IN SECOND TRIMESTER: ICD-10-CM

## 2019-10-21 DIAGNOSIS — Z3A.27 27 WEEKS GESTATION OF PREGNANCY: ICD-10-CM

## 2019-10-21 DIAGNOSIS — Z23 NEED FOR TDAP VACCINATION: ICD-10-CM

## 2019-10-21 DIAGNOSIS — O99.891 MATERNAL CONGENITAL CARDIAC ANOMALY AFFECTING PREGNANCY, ANTEPARTUM, SECOND TRIMESTER: ICD-10-CM

## 2019-10-21 PROCEDURE — 90674 CCIIV4 VAC NO PRSV 0.5 ML IM: CPT | Performed by: NURSE PRACTITIONER

## 2019-10-21 PROCEDURE — 90472 IMMUNIZATION ADMIN EACH ADD: CPT | Performed by: NURSE PRACTITIONER

## 2019-10-21 PROCEDURE — 90715 TDAP VACCINE 7 YRS/> IM: CPT | Performed by: NURSE PRACTITIONER

## 2019-10-21 PROCEDURE — 99214 OFFICE O/P EST MOD 30 MIN: CPT | Performed by: NURSE PRACTITIONER

## 2019-10-21 PROCEDURE — 90471 IMMUNIZATION ADMIN: CPT | Performed by: NURSE PRACTITIONER

## 2019-10-21 NOTE — PROGRESS NOTES
CC: Prenatal visit    Emily Dean is a 29 y.o.  at 27w6d.  Doing well. Still having some palpitations at random times but no more problematic than last visit. She is seeing cardiology now.  Denies contractions, LOF, or VB.  Reports good FM. Denies dysuria, abnormal vaginal d/c, headaches or heartburn.     /70   Wt 112 kg (247 lb)   LMP 2019   BMI 39.87 kg/m²   SVE: NA  Fundal Height (cm): 29 cm  Fetal Heart Rate: 154     Problems (from 19 to present)     Problem Noted Resolved    Varicose veins of both lower extremities with pain 2019 by Deena Manzanares APRN No    Overview Signed 2019 11:26 AM by Deena Manzanares APRN     Encouraged compression stockings or tighter fitting leggings.  BLE WNL in October         Heart palpitations 2019 by Deena Manzanares APRN No    Overview Signed 2019 11:26 AM by Deena Manzanares APRN     H/o ASD/VSD/PDA sx as infant. Has appt for Holter monitor and echo on 10/28 with MD howell/joann on          Maternal congenital cardiac anomaly affecting pregnancy, antepartum, second trimester 2019 by Deena Manzanares APRN No    Overview Addendum 2019 11:27 AM by Deena Manzanares APRN     ASD/VSD/PDA sx as infant         Disorder of placenta in second trimester 2019 by Deena Manzanares APRN No    Overview Addendum 2019 11:43 AM by Deena Manzanares APRN     Placental Henson  19 measurements: 4.05 x 1.27 x 4.46cm.  U/S f/u on 10/14 made no mention of measurement even though that was why the f/u scan was recommended.       Family history of congenital hydrocephalus 2019 by Deena Manzanares APRN No    Overview Addendum 2019 11:28 AM by Deena Manzanares APRN     Pt's son born in . He  at age 6.         Encounter for maternal care for low transverse scar from previous  delivery 2019 by Deena Manzanares, CHONG No    Overview Signed 2019 12:47 PM by Deena Manzanares, CHONG     x3  Plans for RLTCS with BTL                A/P: Emily Dean is a 29 y.o.  at 27w6d.  - RTC in 3 weeks  - MFM wants a growth 4 weeks from 10/14 because EFW was 91%tile at that time.  ~ NEEDS TO SIGN TUBAL CONSENT FORMS NEXT VISIT~     Diagnosis Plan   1. Encounter for maternal care for low transverse scar from previous  delivery     2. Heart palpitations     3. Maternal congenital cardiac anomaly affecting pregnancy, antepartum, second trimester     4. Disorder of placenta in second trimester     5. Family history of congenital hydrocephalus     6. 27 weeks gestation of pregnancy     7. Need for immunization against influenza  Flucelvax Quad=>4Years ([id number])   8. Need for Tdap vaccination  Tdap Vaccine Greater Than or Equal To 8yo IM       Deena Manzanares, APRN  10/21/2019  12:36 PM

## 2019-10-24 DIAGNOSIS — O99.891 MATERNAL CONGENITAL CARDIAC ANOMALY AFFECTING PREGNANCY, ANTEPARTUM, SECOND TRIMESTER: ICD-10-CM

## 2019-10-24 DIAGNOSIS — Z82.79 FAMILY HISTORY OF CONGENITAL HYDROCEPHALUS: ICD-10-CM

## 2019-10-24 DIAGNOSIS — Q24.9 MATERNAL CONGENITAL CARDIAC ANOMALY AFFECTING PREGNANCY, ANTEPARTUM, SECOND TRIMESTER: ICD-10-CM

## 2019-11-07 ENCOUNTER — ROUTINE PRENATAL (OUTPATIENT)
Dept: OBSTETRICS AND GYNECOLOGY | Facility: CLINIC | Age: 29
End: 2019-11-07

## 2019-11-07 VITALS — BODY MASS INDEX: 41.03 KG/M2 | SYSTOLIC BLOOD PRESSURE: 100 MMHG | DIASTOLIC BLOOD PRESSURE: 70 MMHG | WEIGHT: 254.2 LBS

## 2019-11-07 DIAGNOSIS — I83.813 VARICOSE VEINS OF BOTH LOWER EXTREMITIES WITH PAIN: ICD-10-CM

## 2019-11-07 DIAGNOSIS — O34.211 ENCOUNTER FOR MATERNAL CARE FOR LOW TRANSVERSE SCAR FROM PREVIOUS CESAREAN DELIVERY: ICD-10-CM

## 2019-11-07 DIAGNOSIS — R00.2 HEART PALPITATIONS: ICD-10-CM

## 2019-11-07 DIAGNOSIS — O99.213 MATERNAL OBESITY SYNDROME IN THIRD TRIMESTER: Primary | ICD-10-CM

## 2019-11-07 DIAGNOSIS — O43.92 DISORDER OF PLACENTA IN SECOND TRIMESTER: ICD-10-CM

## 2019-11-07 DIAGNOSIS — Q24.9 MATERNAL CONGENITAL CARDIAC ANOMALY AFFECTING PREGNANCY, ANTEPARTUM, SECOND TRIMESTER: ICD-10-CM

## 2019-11-07 DIAGNOSIS — Z3A.30 30 WEEKS GESTATION OF PREGNANCY: ICD-10-CM

## 2019-11-07 DIAGNOSIS — O99.891 MATERNAL CONGENITAL CARDIAC ANOMALY AFFECTING PREGNANCY, ANTEPARTUM, SECOND TRIMESTER: ICD-10-CM

## 2019-11-07 DIAGNOSIS — Z82.79 FAMILY HISTORY OF CONGENITAL HYDROCEPHALUS: ICD-10-CM

## 2019-11-07 PROCEDURE — 99213 OFFICE O/P EST LOW 20 MIN: CPT | Performed by: OBSTETRICS & GYNECOLOGY

## 2019-11-07 RX ORDER — ACETAMINOPHEN 325 MG/1
325 TABLET ORAL EVERY 6 HOURS PRN
COMMUNITY
End: 2020-10-01

## 2019-11-10 PROBLEM — Z3A.30 30 WEEKS GESTATION OF PREGNANCY: Status: ACTIVE | Noted: 2019-11-10

## 2019-11-10 PROBLEM — O99.213 MATERNAL OBESITY SYNDROME IN THIRD TRIMESTER: Status: ACTIVE | Noted: 2019-11-10

## 2019-11-10 NOTE — PROGRESS NOTES
CC: Prenatal visit    Emily Dean is a 29 y.o.  at 30w5d.  Doing well.  No complaints.  Denies contractions, LOF, or VB.  Reports good FM.    /70   Wt 115 kg (254 lb 3.2 oz)   LMP 2019   BMI 41.03 kg/m²   SVE: nd           Problems (from 19 to present)     Problem Noted Resolved    Varicose veins of both lower extremities with pain 2019 by Deena Manzanares APRN No    Overview Addendum 10/21/2019 12:05 PM by Deena Manzanares APRN     Encouraged compression stockings or tighter fitting leggings.  BLE U/S WNL in October.         Heart palpitations 2019 by Deena Manzanares APRN No    Overview Signed 2019 11:26 AM by Deena Manzanares APRN     H/o ASD/VSD/PDA sx as infant. Referral to cardio.         Maternal congenital cardiac anomaly affecting pregnancy, antepartum, second trimester 2019 by Deena Manzanares APRN No    Overview Addendum 10/21/2019 12:04 PM by Deena Manzanares APRN     ASD/VSD/PDA sx as infant  Saw cardiology recently. Has appt for Holter monitor and cardiac echo on 10/28 & f/u with MD on . Progress note in chart.          Disorder of placenta in second trimester 2019 by Deena Manzanares APRN No    Overview Addendum 10/21/2019 12:35 PM by Deena Manzanares APRN     Placental Henson  19 measurements: 4.05 x 1.27 x 4.46cm.  U/S on 10/14 made no mention of measurement; even though that was why the u/s was recommended.         Family history of congenital hydrocephalus 2019 by Deena Manzanares APRN No    Overview Addendum 2019 11:28 AM by Deena Manzanares APRN     Pt's son born in . He  at age 6.         Encounter for maternal care for low transverse scar from previous  delivery 2019 by Deena Manzanares APRN No    Overview Signed 2019 12:47 PM by Deena Manzanares, APRN     x3  Plans for RLTCS with BTL               A/P: Emily Dean is a 29 y.o.  at 30w5d.  - RTC in 1 weeks     Diagnosis Plan   1. Maternal  obesity syndrome in third trimester  US Fetal Biophysical Profile;Without Non-Stress Testing   2. Heart palpitations     3. Varicose veins of both lower extremities with pain     4. Maternal congenital cardiac anomaly affecting pregnancy, antepartum, second trimester     5. Disorder of placenta in second trimester     6. Family history of congenital hydrocephalus     7. Encounter for maternal care for low transverse scar from previous  delivery     8. 30 weeks gestation of pregnancy   still has not gotten Glucola.  Its been on the order for some time strongly urged to get this     Tanvir Moncada MD  11/10/2019  12:18 PM

## 2019-11-11 ENCOUNTER — LAB (OUTPATIENT)
Dept: LAB | Facility: HOSPITAL | Age: 29
End: 2019-11-11

## 2019-11-11 ENCOUNTER — ROUTINE PRENATAL (OUTPATIENT)
Dept: OBSTETRICS AND GYNECOLOGY | Facility: CLINIC | Age: 29
End: 2019-11-11

## 2019-11-11 VITALS — DIASTOLIC BLOOD PRESSURE: 70 MMHG | SYSTOLIC BLOOD PRESSURE: 120 MMHG

## 2019-11-11 DIAGNOSIS — R10.2 PAIN OF ROUND LIGAMENT AFFECTING PREGNANCY, ANTEPARTUM: ICD-10-CM

## 2019-11-11 DIAGNOSIS — O43.92 DISORDER OF PLACENTA IN SECOND TRIMESTER: ICD-10-CM

## 2019-11-11 DIAGNOSIS — O26.899 PAIN OF ROUND LIGAMENT AFFECTING PREGNANCY, ANTEPARTUM: ICD-10-CM

## 2019-11-11 DIAGNOSIS — O34.211 ENCOUNTER FOR MATERNAL CARE FOR LOW TRANSVERSE SCAR FROM PREVIOUS CESAREAN DELIVERY: ICD-10-CM

## 2019-11-11 DIAGNOSIS — Z36.89 ENCOUNTER FOR OTHER SPECIFIED ANTENATAL SCREENING: ICD-10-CM

## 2019-11-11 DIAGNOSIS — Q24.9 MATERNAL CONGENITAL CARDIAC ANOMALY AFFECTING PREGNANCY, ANTEPARTUM, SECOND TRIMESTER: ICD-10-CM

## 2019-11-11 DIAGNOSIS — O46.92 VAGINAL BLEEDING IN PREGNANCY, SECOND TRIMESTER: Primary | ICD-10-CM

## 2019-11-11 DIAGNOSIS — N89.8 VAGINAL DISCHARGE: ICD-10-CM

## 2019-11-11 DIAGNOSIS — O99.891 MATERNAL CONGENITAL CARDIAC ANOMALY AFFECTING PREGNANCY, ANTEPARTUM, SECOND TRIMESTER: ICD-10-CM

## 2019-11-11 DIAGNOSIS — Z3A.30 30 WEEKS GESTATION OF PREGNANCY: ICD-10-CM

## 2019-11-11 LAB
CANDIDA ALBICANS: NEGATIVE
DEPRECATED RDW RBC AUTO: 43 FL (ref 37–54)
ERYTHROCYTE [DISTWIDTH] IN BLOOD BY AUTOMATED COUNT: 13.8 % (ref 12.3–15.4)
GARDNERELLA VAGINALIS: NEGATIVE
GLUCOSE 1H P 100 G GLC PO SERPL-MCNC: 124 MG/DL (ref 60–140)
HCT VFR BLD AUTO: 33.9 % (ref 34–46.6)
HGB BLD-MCNC: 11.2 G/DL (ref 12–15.9)
MCH RBC QN AUTO: 27.9 PG (ref 26.6–33)
MCHC RBC AUTO-ENTMCNC: 33 G/DL (ref 31.5–35.7)
MCV RBC AUTO: 84.5 FL (ref 79–97)
PLATELET # BLD AUTO: 245 10*3/MM3 (ref 140–450)
PMV BLD AUTO: 11.5 FL (ref 6–12)
RBC # BLD AUTO: 4.01 10*6/MM3 (ref 3.77–5.28)
T VAGINALIS DNA VAG QL PROBE+SIG AMP: NEGATIVE
WBC NRBC COR # BLD: 10.57 10*3/MM3 (ref 3.4–10.8)

## 2019-11-11 PROCEDURE — 87480 CANDIDA DNA DIR PROBE: CPT | Performed by: NURSE PRACTITIONER

## 2019-11-11 PROCEDURE — 99214 OFFICE O/P EST MOD 30 MIN: CPT | Performed by: NURSE PRACTITIONER

## 2019-11-11 PROCEDURE — 82950 GLUCOSE TEST: CPT

## 2019-11-11 PROCEDURE — 59025 FETAL NON-STRESS TEST: CPT | Performed by: NURSE PRACTITIONER

## 2019-11-11 PROCEDURE — 85027 COMPLETE CBC AUTOMATED: CPT

## 2019-11-11 PROCEDURE — 87660 TRICHOMONAS VAGIN DIR PROBE: CPT | Performed by: NURSE PRACTITIONER

## 2019-11-11 PROCEDURE — 87510 GARDNER VAG DNA DIR PROBE: CPT | Performed by: NURSE PRACTITIONER

## 2019-11-11 NOTE — PROGRESS NOTES
CC: lower abdominal/pelvic cramping and bleeding x24 hours    Emily Dean is a 29 y.o.  at 30w6d. Had bright red bleeding yesterday morning that saturated her toilet paper. Since then it has been more of a brown-red and only sporadically. She is having intermittent lower pelvic cramping into her groin on both sides. The sharp pain lasts for about 20 seconds but she feels a constant pressure that is very uncomfortable. Rates the pain 5/10. Baby is very active.     /70   LMP 2019   SVE: speculum exam revealed a closed, thick cervix with a brown-yellow, mucus-like discharge. Vag panel was obtained. NST reactive today.     Fetal Heart Rate: 138     Problems (from 19 to present)     Problem Noted Resolved    Varicose veins of both lower extremities with pain 2019 by Deena Manzanares APRN No    Overview Addendum 10/21/2019 12:05 PM by Deena Manzanares APRN     Encouraged compression stockings or tighter fitting leggings.  BLE U/S WNL in October.         Heart palpitations 2019 by Deena Manzanares APRN No    Overview Signed 2019 11:26 AM by Deena Manzanares APRN     H/o ASD/VSD/PDA sx as infant. Referral to cardio.         Maternal congenital cardiac anomaly affecting pregnancy, antepartum, second trimester 2019 by Deena Manzanares APRN No    Overview Addendum 10/21/2019 12:04 PM by Deena Manzanares APRN     ASD/VSD/PDA sx as infant  Saw cardiology recently. Has appt for Holter monitor and cardiac echo on 10/28 & f/u with MD on . Progress note in chart.          Disorder of placenta in second trimester 2019 by Deena Manzanares APRN No    Overview Addendum 10/21/2019 12:35 PM by Deena Manzanares APRN     Placental Henson  19 measurements: 4.05 x 1.27 x 4.46cm.  U/S on 10/14 made no mention of measurement; even though that was why the u/s was recommended.         Family history of congenital hydrocephalus 2019 by Deena Manzanares, APRN No    Overview Addendum  2019 11:28 AM by Deena Manzanares APRN     Pt's son born in . He  at age 6.         Encounter for maternal care for low transverse scar from previous  delivery 2019 by Deena Manzanares APRN No    Overview Signed 2019 12:47 PM by Deena Manzanares APRN     x3  Plans for RLTCS with BTL               A/P: Emily Dean is a 29 y.o.  at 30w6d.  - RTC in 1 week  - Bleeding precautions reviewed.     Diagnosis Plan   1. Vaginal bleeding in pregnancy, second trimester     2. Maternal congenital cardiac anomaly affecting pregnancy, antepartum, second trimester     3. Disorder of placenta in second trimester     4. Encounter for maternal care for low transverse scar from previous  delivery     5. Vaginal discharge  Gardnerella vaginalis, Trichomonas vaginalis, Candida albicans, DNA - Swab, Vagina   6. 30 weeks gestation of pregnancy     7. Pain of round ligament affecting pregnancy, antepartum  Fitted for Prenatal Cradle today     CHONG Perez  2019  9:45 AM

## 2019-11-21 ENCOUNTER — ROUTINE PRENATAL (OUTPATIENT)
Dept: OBSTETRICS AND GYNECOLOGY | Facility: CLINIC | Age: 29
End: 2019-11-21

## 2019-11-21 VITALS — WEIGHT: 257.6 LBS | DIASTOLIC BLOOD PRESSURE: 78 MMHG | SYSTOLIC BLOOD PRESSURE: 122 MMHG | BODY MASS INDEX: 41.58 KG/M2

## 2019-11-21 DIAGNOSIS — O99.891 MATERNAL CONGENITAL CARDIAC ANOMALY AFFECTING PREGNANCY, ANTEPARTUM, SECOND TRIMESTER: Primary | ICD-10-CM

## 2019-11-21 DIAGNOSIS — O99.213 MATERNAL OBESITY SYNDROME IN THIRD TRIMESTER: ICD-10-CM

## 2019-11-21 DIAGNOSIS — O26.899 PAIN OF ROUND LIGAMENT AFFECTING PREGNANCY, ANTEPARTUM: ICD-10-CM

## 2019-11-21 DIAGNOSIS — O46.93 VAGINAL BLEEDING IN PREGNANCY, THIRD TRIMESTER: ICD-10-CM

## 2019-11-21 DIAGNOSIS — O34.211 ENCOUNTER FOR MATERNAL CARE FOR LOW TRANSVERSE SCAR FROM PREVIOUS CESAREAN DELIVERY: ICD-10-CM

## 2019-11-21 DIAGNOSIS — Q24.9 MATERNAL CONGENITAL CARDIAC ANOMALY AFFECTING PREGNANCY, ANTEPARTUM, SECOND TRIMESTER: Primary | ICD-10-CM

## 2019-11-21 DIAGNOSIS — R00.2 HEART PALPITATIONS: ICD-10-CM

## 2019-11-21 DIAGNOSIS — Z3A.32 32 WEEKS GESTATION OF PREGNANCY: ICD-10-CM

## 2019-11-21 DIAGNOSIS — R10.2 PAIN OF ROUND LIGAMENT AFFECTING PREGNANCY, ANTEPARTUM: ICD-10-CM

## 2019-11-21 DIAGNOSIS — O43.92 DISORDER OF PLACENTA IN SECOND TRIMESTER: ICD-10-CM

## 2019-11-21 DIAGNOSIS — Z82.79 FAMILY HISTORY OF CONGENITAL HYDROCEPHALUS: ICD-10-CM

## 2019-11-21 DIAGNOSIS — I83.813 VARICOSE VEINS OF BOTH LOWER EXTREMITIES WITH PAIN: ICD-10-CM

## 2019-11-21 PROBLEM — Z3A.30 30 WEEKS GESTATION OF PREGNANCY: Status: RESOLVED | Noted: 2019-11-10 | Resolved: 2019-11-21

## 2019-11-21 PROCEDURE — 99213 OFFICE O/P EST LOW 20 MIN: CPT | Performed by: OBSTETRICS & GYNECOLOGY

## 2019-11-21 NOTE — PROGRESS NOTES
CC: Prenatal visit    Emily Dean is a 29 y.o.  at 32w2d.  Doing well.  No complaints.  Denies contractions, LOF, or VB.  Reports good FM.  And has been having episodes of vaginal bleeding prior to visit today.  States that bleeding normally is just mild spotting but does have some episodes were bleeding is heavy likely menses.  Gave patient strict bleeding precautions and fetal movement precautions.  Reviewed ultrasound today with no evidence of placenta previa patient is being watched closely for placental lakes plan to have her continue weekly BPP's.  BPP today was 8 out of 8.  Patient does have history of  delivery at 35 weeks with her last child.  Recent pelvic swab was negative for any infections.  Unclear cause of bleeding possible chronic mild abruption we will continue to monitor closely.    /78   Wt 117 kg (257 lb 9.6 oz)   LMP 2019   BMI 41.58 kg/m²   Fetal heart rate: 171 bpm  Fundal height: 32 cm  Speculum exam with closed cervix no active bleeding noted, cervix was slightly low in the pelvis and vagina may explain why she is feeling so much pressure.           Problems (from 19 to present)     Problem Noted Resolved    Varicose veins of both lower extremities with pain 2019 by Deena Manzanares APRN No    Overview Addendum 10/21/2019 12:05 PM by Deena Manzanares APRN     Encouraged compression stockings or tighter fitting leggings.  BLE U/S WNL in October.         Heart palpitations 2019 by Deena Manzanares APRN No    Overview Signed 2019 11:26 AM by Deena Manzanares APRN     H/o ASD/VSD/PDA sx as infant. Referral to cardio.         Maternal congenital cardiac anomaly affecting pregnancy, antepartum, second trimester 2019 by Deena Manzanares APRN No    Overview Addendum 10/21/2019 12:04 PM by Deena Manzanares APRN     ASD/VSD/PDA sx as infant  Saw cardiology recently. Has appt for Holter monitor and cardiac echo on 10/28 & f/u with MD on .  Progress note in chart.          Disorder of placenta in second trimester 2019 by Deena Manzanares APRN No    Overview Addendum 10/21/2019 12:35 PM by Deena Manzanares APRN     Placental Henson  19 measurements: 4.05 x 1.27 x 4.46cm.  U/S on 10/14 made no mention of measurement; even though that was why the u/s was recommended.         Family history of congenital hydrocephalus 2019 by Deena Manzanares APRN No    Overview Addendum 2019 11:28 AM by Deena Manzanares APRN     Pt's son born in . He  at age 6.         Encounter for maternal care for low transverse scar from previous  delivery 2019 by Deena Manzanares APRN No    Overview Signed 2019 12:47 PM by Deena Manzanares APRN     x3  Plans for RLTCS with BTL               A/P: Emily Dean is a 29 y.o.  at 32w2d.  Appropriately progressing pregnancy at this time, with bleeding of uncertain origin no active bleeding at this time and reassuring fetal status on ultrasound today.  Plan to have patient return in 1 week to see me and to get BPP.  Strict bleeding precautions given fetal kick count and  labor precautions given also.  Patient to return sooner as needed.  - RTC in 1 weeks     Diagnosis Plan   1. Maternal congenital cardiac anomaly affecting pregnancy, antepartum, second trimester     2. Family history of congenital hydrocephalus     3. Varicose veins of both lower extremities with pain     4. Maternal obesity syndrome in third trimester     5. Heart palpitations     6. Pain of round ligament affecting pregnancy, antepartum     7. 32 weeks gestation of pregnancy     8. Vaginal bleeding in pregnancy, third trimester     9. Disorder of placenta in second trimester     10. Encounter for maternal care for low transverse scar from previous  delivery       Aashish Wren DO  2019  9:19 AM

## 2019-11-25 DIAGNOSIS — Z3A.30 30 WEEKS GESTATION OF PREGNANCY: ICD-10-CM

## 2019-11-25 DIAGNOSIS — Z82.79 FAMILY HISTORY OF CONGENITAL ANOMALIES: ICD-10-CM

## 2019-11-25 DIAGNOSIS — O43.193: Primary | ICD-10-CM

## 2019-11-26 DIAGNOSIS — Z82.79 FAMILY HISTORY OF CONGENITAL ANOMALIES: ICD-10-CM

## 2019-11-26 DIAGNOSIS — O43.193: ICD-10-CM

## 2019-11-26 DIAGNOSIS — Z3A.30 30 WEEKS GESTATION OF PREGNANCY: ICD-10-CM

## 2019-12-02 DIAGNOSIS — O99.213 MATERNAL OBESITY SYNDROME IN THIRD TRIMESTER: ICD-10-CM

## 2019-12-05 ENCOUNTER — ROUTINE PRENATAL (OUTPATIENT)
Dept: OBSTETRICS AND GYNECOLOGY | Facility: CLINIC | Age: 29
End: 2019-12-05

## 2019-12-05 VITALS — WEIGHT: 263 LBS | SYSTOLIC BLOOD PRESSURE: 129 MMHG | BODY MASS INDEX: 42.45 KG/M2 | DIASTOLIC BLOOD PRESSURE: 78 MMHG

## 2019-12-05 DIAGNOSIS — Z3A.34 34 WEEKS GESTATION OF PREGNANCY: Primary | ICD-10-CM

## 2019-12-05 DIAGNOSIS — Q24.9 MATERNAL CONGENITAL CARDIAC ANOMALY AFFECTING PREGNANCY, ANTEPARTUM, SECOND TRIMESTER: ICD-10-CM

## 2019-12-05 DIAGNOSIS — O43.92 DISORDER OF PLACENTA IN SECOND TRIMESTER: ICD-10-CM

## 2019-12-05 DIAGNOSIS — Z82.79 FAMILY HISTORY OF CONGENITAL HYDROCEPHALUS: ICD-10-CM

## 2019-12-05 DIAGNOSIS — O34.211 ENCOUNTER FOR MATERNAL CARE FOR LOW TRANSVERSE SCAR FROM PREVIOUS CESAREAN DELIVERY: ICD-10-CM

## 2019-12-05 DIAGNOSIS — I83.813 VARICOSE VEINS OF BOTH LOWER EXTREMITIES WITH PAIN: ICD-10-CM

## 2019-12-05 DIAGNOSIS — R00.2 HEART PALPITATIONS: ICD-10-CM

## 2019-12-05 DIAGNOSIS — O99.891 MATERNAL CONGENITAL CARDIAC ANOMALY AFFECTING PREGNANCY, ANTEPARTUM, SECOND TRIMESTER: ICD-10-CM

## 2019-12-05 PROCEDURE — 87653 STREP B DNA AMP PROBE: CPT | Performed by: OBSTETRICS & GYNECOLOGY

## 2019-12-05 PROCEDURE — 99213 OFFICE O/P EST LOW 20 MIN: CPT | Performed by: OBSTETRICS & GYNECOLOGY

## 2019-12-05 NOTE — PROGRESS NOTES
CC: Prenatal visit    Emily Dean is a 29 y.o.  at 34w2d.  Doing well.  No complaints.  Denies contractions, LOF, or VB.  Reports good FM.  Patient has not had any further bleeding since last episode.  Ultrasound today showed fetus at the 87th percentile with BPP 8 out of 8 and LALA of 10.  Patient would like me to do her , will keep scheduled date of  for repeat     /78   Wt 119 kg (263 lb)   LMP 2019   BMI 42.45 kg/m²     Fundal Height (cm): 34 cm  Fetal Heart Rate: 134     Problems (from 19 to present)     Problem Noted Resolved    Varicose veins of both lower extremities with pain 2019 by Deena Manzanares APRN No    Overview Addendum 10/21/2019 12:05 PM by Deena Manzanares APRN     Encouraged compression stockings or tighter fitting leggings.  BLE U/S WNL in October.         Heart palpitations 2019 by Deena Manzanares APRN No    Overview Signed 2019 11:26 AM by Deena Manzanares APRN     H/o ASD/VSD/PDA sx as infant. Referral to cardio.         Maternal congenital cardiac anomaly affecting pregnancy, antepartum, second trimester 2019 by Deena Manzanares APRN No    Overview Addendum 10/21/2019 12:04 PM by Deena Manzanares APRN     ASD/VSD/PDA sx as infant  Saw cardiology recently. Has appt for Holter monitor and cardiac echo on 10/28 & f/u with MD on . Progress note in chart.          Disorder of placenta in second trimester 2019 by Deena Manzanares APRN No    Overview Addendum 10/21/2019 12:35 PM by Deena Manzanares APRN     Placental Henson  19 measurements: 4.05 x 1.27 x 4.46cm.  U/S on 10/14 made no mention of measurement; even though that was why the u/s was recommended.         Family history of congenital hydrocephalus 2019 by Deena Manzanares APRN No    Overview Addendum 2019 11:28 AM by Deena Manzanares APRN     Pt's son born in . He  at age 6.         Encounter for maternal care for low transverse scar  from previous  delivery 2019 by Deena Manzanares, CHONG No    Overview Signed 2019 12:47 PM by Deena Manzanares, CHONG     x3  Plans for RLTCS with BTL               A/P: Emily Dean is a 29 y.o.  at 34w2d.  Doing well with appropriately progressing pregnancy at this time.  Size equal to dates.  Reassuring fetal testing.  Repeat  on  with tubal ligation.  Patient to follow-up with me in 1 week and BPP in 1 week.  Bleeding and cramping precautions given  labor precautions and fetal kick count precautions given.  - RTC in 1 weeks     Diagnosis Plan   1. 34 weeks gestation of pregnancy  Group B Strep (Molecular) - Swab, Vaginal/Rectum   2. Heart palpitations     3. Varicose veins of both lower extremities with pain     4. Maternal congenital cardiac anomaly affecting pregnancy, antepartum, second trimester     5. Disorder of placenta in second trimester     6. Family history of congenital hydrocephalus     7. Encounter for maternal care for low transverse scar from previous  delivery       Aashish Wren DO  2019  10:02 AM

## 2019-12-06 LAB — GROUP B STREP, DNA: NEGATIVE

## 2019-12-12 ENCOUNTER — ANESTHESIA EVENT (OUTPATIENT)
Dept: LABOR AND DELIVERY | Facility: HOSPITAL | Age: 29
End: 2019-12-12

## 2019-12-12 ENCOUNTER — ANESTHESIA (OUTPATIENT)
Dept: LABOR AND DELIVERY | Facility: HOSPITAL | Age: 29
End: 2019-12-12

## 2019-12-12 ENCOUNTER — HOSPITAL ENCOUNTER (INPATIENT)
Facility: HOSPITAL | Age: 29
LOS: 3 days | Discharge: HOME | End: 2019-12-15
Attending: OBSTETRICS & GYNECOLOGY | Admitting: OBSTETRICS & GYNECOLOGY

## 2019-12-12 PROBLEM — O60.03 PRETERM LABOR IN THIRD TRIMESTER: Status: ACTIVE | Noted: 2019-12-12

## 2019-12-12 LAB
A1 MICROGLOB PLACENTAL VAG QL: POSITIVE
CANDIDA ALBICANS: NEGATIVE
DEPRECATED RDW RBC AUTO: 44.1 FL (ref 37–54)
ERYTHROCYTE [DISTWIDTH] IN BLOOD BY AUTOMATED COUNT: 14.6 % (ref 12.3–15.4)
GARDNERELLA VAGINALIS: NEGATIVE
HCT VFR BLD AUTO: 34.9 % (ref 34–46.6)
HGB BLD-MCNC: 11.4 G/DL (ref 12–15.9)
MCH RBC QN AUTO: 27.5 PG (ref 26.6–33)
MCHC RBC AUTO-ENTMCNC: 32.7 G/DL (ref 31.5–35.7)
MCV RBC AUTO: 84.3 FL (ref 79–97)
PLATELET # BLD AUTO: 209 10*3/MM3 (ref 140–450)
PMV BLD AUTO: 11.6 FL (ref 6–12)
RBC # BLD AUTO: 4.14 10*6/MM3 (ref 3.77–5.28)
T VAGINALIS DNA VAG QL PROBE+SIG AMP: NEGATIVE
WBC NRBC COR # BLD: 11.9 10*3/MM3 (ref 3.4–10.8)

## 2019-12-12 PROCEDURE — 80307 DRUG TEST PRSMV CHEM ANLYZR: CPT | Performed by: OBSTETRICS & GYNECOLOGY

## 2019-12-12 PROCEDURE — 87660 TRICHOMONAS VAGIN DIR PROBE: CPT | Performed by: OBSTETRICS & GYNECOLOGY

## 2019-12-12 PROCEDURE — 87480 CANDIDA DNA DIR PROBE: CPT | Performed by: OBSTETRICS & GYNECOLOGY

## 2019-12-12 PROCEDURE — 85027 COMPLETE CBC AUTOMATED: CPT | Performed by: OBSTETRICS & GYNECOLOGY

## 2019-12-12 PROCEDURE — 99222 1ST HOSP IP/OBS MODERATE 55: CPT | Performed by: OBSTETRICS & GYNECOLOGY

## 2019-12-12 PROCEDURE — 87510 GARDNER VAG DNA DIR PROBE: CPT | Performed by: OBSTETRICS & GYNECOLOGY

## 2019-12-12 PROCEDURE — 86901 BLOOD TYPING SEROLOGIC RH(D): CPT | Performed by: OBSTETRICS & GYNECOLOGY

## 2019-12-12 PROCEDURE — 25010000002 MORPHINE PER 10 MG: Performed by: NURSE ANESTHETIST, CERTIFIED REGISTERED

## 2019-12-12 PROCEDURE — 86900 BLOOD TYPING SEROLOGIC ABO: CPT | Performed by: OBSTETRICS & GYNECOLOGY

## 2019-12-12 PROCEDURE — 86850 RBC ANTIBODY SCREEN: CPT | Performed by: OBSTETRICS & GYNECOLOGY

## 2019-12-12 PROCEDURE — 84112 EVAL AMNIOTIC FLUID PROTEIN: CPT | Performed by: OBSTETRICS & GYNECOLOGY

## 2019-12-12 PROCEDURE — 86923 COMPATIBILITY TEST ELECTRIC: CPT

## 2019-12-12 PROCEDURE — 94799 UNLISTED PULMONARY SVC/PX: CPT

## 2019-12-12 DEVICE — CLIP FALLOP FILSHIE TI PR STRL BX/20: Type: IMPLANTABLE DEVICE | Site: FALLOPIAN TUBE | Status: FUNCTIONAL

## 2019-12-12 RX ORDER — BUPIVACAINE HYDROCHLORIDE 7.5 MG/ML
INJECTION, SOLUTION EPIDURAL; RETROBULBAR
Status: COMPLETED | OUTPATIENT
Start: 2019-12-12 | End: 2019-12-12

## 2019-12-12 RX ORDER — OXYTOCIN/0.9 % SODIUM CHLORIDE 30/500 ML
PLASTIC BAG, INJECTION (ML) INTRAVENOUS AS NEEDED
Status: DISCONTINUED | OUTPATIENT
Start: 2019-12-12 | End: 2019-12-13 | Stop reason: SURG

## 2019-12-12 RX ORDER — TRISODIUM CITRATE DIHYDRATE AND CITRIC ACID MONOHYDRATE 500; 334 MG/5ML; MG/5ML
30 SOLUTION ORAL ONCE
Status: DISCONTINUED | OUTPATIENT
Start: 2019-12-12 | End: 2019-12-13 | Stop reason: HOSPADM

## 2019-12-12 RX ORDER — MORPHINE SULFATE 1 MG/ML
INJECTION, SOLUTION EPIDURAL; INTRATHECAL; INTRAVENOUS AS NEEDED
Status: DISCONTINUED | OUTPATIENT
Start: 2019-12-12 | End: 2019-12-13 | Stop reason: SURG

## 2019-12-12 RX ORDER — OXYTOCIN/0.9 % SODIUM CHLORIDE 30/500 ML
PLASTIC BAG, INJECTION (ML) INTRAVENOUS
Status: COMPLETED
Start: 2019-12-12 | End: 2019-12-12

## 2019-12-12 RX ORDER — SODIUM CHLORIDE 0.9 % (FLUSH) 0.9 %
3-10 SYRINGE (ML) INJECTION AS NEEDED
Status: DISCONTINUED | OUTPATIENT
Start: 2019-12-12 | End: 2019-12-13 | Stop reason: HOSPADM

## 2019-12-12 RX ORDER — SODIUM CHLORIDE, SODIUM LACTATE, POTASSIUM CHLORIDE, CALCIUM CHLORIDE 600; 310; 30; 20 MG/100ML; MG/100ML; MG/100ML; MG/100ML
INJECTION, SOLUTION INTRAVENOUS
Status: COMPLETED
Start: 2019-12-12 | End: 2019-12-12

## 2019-12-12 RX ORDER — BUPIVACAINE HCL/0.9 % NACL/PF 0.1 %
2 PLASTIC BAG, INJECTION (ML) EPIDURAL ONCE
Status: COMPLETED | OUTPATIENT
Start: 2019-12-12 | End: 2019-12-12

## 2019-12-12 RX ORDER — LIDOCAINE HYDROCHLORIDE 10 MG/ML
5 INJECTION, SOLUTION EPIDURAL; INFILTRATION; INTRACAUDAL; PERINEURAL AS NEEDED
Status: DISCONTINUED | OUTPATIENT
Start: 2019-12-12 | End: 2019-12-13 | Stop reason: HOSPADM

## 2019-12-12 RX ORDER — SODIUM CHLORIDE 0.9 % (FLUSH) 0.9 %
3 SYRINGE (ML) INJECTION EVERY 12 HOURS SCHEDULED
Status: DISCONTINUED | OUTPATIENT
Start: 2019-12-12 | End: 2019-12-13 | Stop reason: HOSPADM

## 2019-12-12 RX ORDER — MIDAZOLAM HYDROCHLORIDE 1 MG/ML
INJECTION INTRAMUSCULAR; INTRAVENOUS AS NEEDED
Status: DISCONTINUED | OUTPATIENT
Start: 2019-12-12 | End: 2019-12-13 | Stop reason: SURG

## 2019-12-12 RX ADMIN — OXYTOCIN-SODIUM CHLORIDE 0.9% IV SOLN 30 UNIT/500ML 50 ML: 30-0.9/5 SOLUTION at 23:48

## 2019-12-12 RX ADMIN — Medication 0.25 MG: at 23:08

## 2019-12-12 RX ADMIN — Medication 2 G: at 23:14

## 2019-12-12 RX ADMIN — MIDAZOLAM HYDROCHLORIDE 2 MG: 2 INJECTION, SOLUTION INTRAMUSCULAR; INTRAVENOUS at 23:35

## 2019-12-12 RX ADMIN — OXYTOCIN-SODIUM CHLORIDE 0.9% IV SOLN 30 UNIT/500ML 50 ML: 30-0.9/5 SOLUTION at 23:43

## 2019-12-12 RX ADMIN — BUPIVACAINE HYDROCHLORIDE 1.6 ML: 7.5 INJECTION, SOLUTION EPIDURAL; RETROBULBAR at 23:08

## 2019-12-12 RX ADMIN — OXYTOCIN-SODIUM CHLORIDE 0.9% IV SOLN 30 UNIT/500ML 50 ML: 30-0.9/5 SOLUTION at 23:59

## 2019-12-12 RX ADMIN — SODIUM CHLORIDE, POTASSIUM CHLORIDE, SODIUM LACTATE AND CALCIUM CHLORIDE 400 ML: 600; 310; 30; 20 INJECTION, SOLUTION INTRAVENOUS at 23:20

## 2019-12-12 RX ADMIN — OXYTOCIN-SODIUM CHLORIDE 0.9% IV SOLN 30 UNIT/500ML 50 ML: 30-0.9/5 SOLUTION at 23:35

## 2019-12-12 RX ADMIN — OXYTOCIN-SODIUM CHLORIDE 0.9% IV SOLN 30 UNIT/500ML 50 ML: 30-0.9/5 SOLUTION at 23:31

## 2019-12-12 RX ADMIN — SODIUM CHLORIDE, POTASSIUM CHLORIDE, SODIUM LACTATE AND CALCIUM CHLORIDE: 600; 310; 30; 20 INJECTION, SOLUTION INTRAVENOUS at 22:55

## 2019-12-12 RX ADMIN — OXYTOCIN-SODIUM CHLORIDE 0.9% IV SOLN 30 UNIT/500ML 50 ML: 30-0.9/5 SOLUTION at 23:52

## 2019-12-13 LAB
ABO GROUP BLD: NORMAL
AMPHET+METHAMPHET UR QL: NEGATIVE
AMPHETAMINES UR QL: NEGATIVE
BARBITURATES UR QL SCN: NEGATIVE
BASOPHILS # BLD AUTO: 0.02 10*3/MM3 (ref 0–0.2)
BASOPHILS NFR BLD AUTO: 0.1 % (ref 0–1.5)
BENZODIAZ UR QL SCN: NEGATIVE
BLD GP AB SCN SERPL QL: NEGATIVE
BUPRENORPHINE SERPL-MCNC: NEGATIVE NG/ML
CANNABINOIDS SERPL QL: NEGATIVE
COCAINE UR QL: NEGATIVE
DEPRECATED RDW RBC AUTO: 44.7 FL (ref 37–54)
EOSINOPHIL # BLD AUTO: 0 10*3/MM3 (ref 0–0.4)
EOSINOPHIL NFR BLD AUTO: 0 % (ref 0.3–6.2)
ERYTHROCYTE [DISTWIDTH] IN BLOOD BY AUTOMATED COUNT: 14.6 % (ref 12.3–15.4)
HCT VFR BLD AUTO: 30.5 % (ref 34–46.6)
HGB BLD-MCNC: 10 G/DL (ref 12–15.9)
IMM GRANULOCYTES # BLD AUTO: 0.07 10*3/MM3 (ref 0–0.05)
IMM GRANULOCYTES NFR BLD AUTO: 0.4 % (ref 0–0.5)
LYMPHOCYTES # BLD AUTO: 0.98 10*3/MM3 (ref 0.7–3.1)
LYMPHOCYTES NFR BLD AUTO: 5.5 % (ref 19.6–45.3)
Lab: NORMAL
MCH RBC QN AUTO: 27.6 PG (ref 26.6–33)
MCHC RBC AUTO-ENTMCNC: 32.8 G/DL (ref 31.5–35.7)
MCV RBC AUTO: 84.3 FL (ref 79–97)
METHADONE UR QL SCN: NEGATIVE
MONOCYTES # BLD AUTO: 0.75 10*3/MM3 (ref 0.1–0.9)
MONOCYTES NFR BLD AUTO: 4.2 % (ref 5–12)
NEUTROPHILS # BLD AUTO: 15.84 10*3/MM3 (ref 1.7–7)
NEUTROPHILS NFR BLD AUTO: 89.8 % (ref 42.7–76)
NRBC BLD AUTO-RTO: 0 /100 WBC (ref 0–0.2)
OPIATES UR QL: NEGATIVE
OXYCODONE UR QL SCN: NEGATIVE
PCP UR QL SCN: NEGATIVE
PLATELET # BLD AUTO: 183 10*3/MM3 (ref 140–450)
PMV BLD AUTO: 11.4 FL (ref 6–12)
PROPOXYPH UR QL: NEGATIVE
RBC # BLD AUTO: 3.62 10*6/MM3 (ref 3.77–5.28)
RH BLD: POSITIVE
T&S EXPIRATION DATE: NORMAL
TRICYCLICS UR QL SCN: NEGATIVE
WBC NRBC COR # BLD: 17.66 10*3/MM3 (ref 3.4–10.8)
WHOLE BLOOD HOLD SPECIMEN: NORMAL

## 2019-12-13 PROCEDURE — 25010000002 FENTANYL CITRATE (PF) 250 MCG/5ML SOLUTION: Performed by: NURSE ANESTHETIST, CERTIFIED REGISTERED

## 2019-12-13 PROCEDURE — 94799 UNLISTED PULMONARY SVC/PX: CPT

## 2019-12-13 PROCEDURE — 04LF0ZU OCCLUSION OF LEFT UTERINE ARTERY, OPEN APPROACH: ICD-10-PCS | Performed by: OBSTETRICS & GYNECOLOGY

## 2019-12-13 PROCEDURE — 59514 CESAREAN DELIVERY ONLY: CPT | Performed by: OBSTETRICS & GYNECOLOGY

## 2019-12-13 PROCEDURE — 0UL70CZ OCCLUSION OF BILATERAL FALLOPIAN TUBES WITH EXTRALUMINAL DEVICE, OPEN APPROACH: ICD-10-PCS | Performed by: OBSTETRICS & GYNECOLOGY

## 2019-12-13 PROCEDURE — 25010000002 METHYLERGONOVINE MALEATE PER 0.2 MG: Performed by: OBSTETRICS & GYNECOLOGY

## 2019-12-13 PROCEDURE — 94760 N-INVAS EAR/PLS OXIMETRY 1: CPT

## 2019-12-13 PROCEDURE — 25010000002 ONDANSETRON PER 1 MG: Performed by: OBSTETRICS & GYNECOLOGY

## 2019-12-13 PROCEDURE — 25010000002 CEFAZOLIN PER 500 MG: Performed by: OBSTETRICS & GYNECOLOGY

## 2019-12-13 PROCEDURE — 25010000002 MIDAZOLAM PER 1 MG: Performed by: NURSE ANESTHETIST, CERTIFIED REGISTERED

## 2019-12-13 PROCEDURE — 51703 INSERT BLADDER CATH COMPLEX: CPT

## 2019-12-13 PROCEDURE — 58611 LIGATE OVIDUCT(S) ADD-ON: CPT | Performed by: OBSTETRICS & GYNECOLOGY

## 2019-12-13 PROCEDURE — 25010000002 HYDROMORPHONE 1 MG/ML SOLUTION: Performed by: OBSTETRICS & GYNECOLOGY

## 2019-12-13 PROCEDURE — 85025 COMPLETE CBC W/AUTO DIFF WBC: CPT | Performed by: OBSTETRICS & GYNECOLOGY

## 2019-12-13 RX ORDER — PROMETHAZINE HYDROCHLORIDE 12.5 MG/1
12.5 TABLET ORAL EVERY 6 HOURS PRN
Status: DISCONTINUED | OUTPATIENT
Start: 2019-12-13 | End: 2019-12-13 | Stop reason: HOSPADM

## 2019-12-13 RX ORDER — SIMETHICONE 80 MG
80 TABLET,CHEWABLE ORAL 4 TIMES DAILY PRN
Status: DISCONTINUED | OUTPATIENT
Start: 2019-12-13 | End: 2019-12-15 | Stop reason: HOSPADM

## 2019-12-13 RX ORDER — DIPHENHYDRAMINE HCL 25 MG
25 CAPSULE ORAL EVERY 4 HOURS PRN
Status: DISCONTINUED | OUTPATIENT
Start: 2019-12-13 | End: 2019-12-13 | Stop reason: SDUPTHER

## 2019-12-13 RX ORDER — PROMETHAZINE HYDROCHLORIDE 12.5 MG/1
12.5 SUPPOSITORY RECTAL EVERY 6 HOURS PRN
Status: DISCONTINUED | OUTPATIENT
Start: 2019-12-13 | End: 2019-12-13 | Stop reason: HOSPADM

## 2019-12-13 RX ORDER — MISOPROSTOL 200 UG/1
600 TABLET ORAL ONCE
Status: DISCONTINUED | OUTPATIENT
Start: 2019-12-13 | End: 2019-12-15 | Stop reason: HOSPADM

## 2019-12-13 RX ORDER — OXYTOCIN/0.9 % SODIUM CHLORIDE 30/500 ML
650 PLASTIC BAG, INJECTION (ML) INTRAVENOUS ONCE
Status: DISCONTINUED | OUTPATIENT
Start: 2019-12-13 | End: 2019-12-15 | Stop reason: HOSPADM

## 2019-12-13 RX ORDER — NALOXONE HCL 0.4 MG/ML
0.1 VIAL (ML) INJECTION
Status: DISCONTINUED | OUTPATIENT
Start: 2019-12-13 | End: 2019-12-15 | Stop reason: HOSPADM

## 2019-12-13 RX ORDER — ONDANSETRON 2 MG/ML
4 INJECTION INTRAMUSCULAR; INTRAVENOUS EVERY 6 HOURS PRN
Status: DISCONTINUED | OUTPATIENT
Start: 2019-12-13 | End: 2019-12-13 | Stop reason: HOSPADM

## 2019-12-13 RX ORDER — PRENATAL VIT/IRON FUM/FOLIC AC 27MG-0.8MG
1 TABLET ORAL DAILY
Status: DISCONTINUED | OUTPATIENT
Start: 2019-12-13 | End: 2019-12-15 | Stop reason: HOSPADM

## 2019-12-13 RX ORDER — BISACODYL 5 MG/1
10 TABLET, DELAYED RELEASE ORAL DAILY PRN
Status: DISCONTINUED | OUTPATIENT
Start: 2019-12-13 | End: 2019-12-15 | Stop reason: HOSPADM

## 2019-12-13 RX ORDER — ONDANSETRON 2 MG/ML
4 INJECTION INTRAMUSCULAR; INTRAVENOUS EVERY 6 HOURS PRN
Status: DISCONTINUED | OUTPATIENT
Start: 2019-12-13 | End: 2019-12-15 | Stop reason: HOSPADM

## 2019-12-13 RX ORDER — ERYTHROMYCIN 5 MG/G
OINTMENT OPHTHALMIC
Status: DISPENSED
Start: 2019-12-13 | End: 2019-12-13

## 2019-12-13 RX ORDER — DOCUSATE SODIUM 100 MG/1
100 CAPSULE, LIQUID FILLED ORAL 2 TIMES DAILY
Status: DISCONTINUED | OUTPATIENT
Start: 2019-12-13 | End: 2019-12-15 | Stop reason: HOSPADM

## 2019-12-13 RX ORDER — ONDANSETRON 4 MG/1
4 TABLET, FILM COATED ORAL EVERY 6 HOURS PRN
Status: DISCONTINUED | OUTPATIENT
Start: 2019-12-13 | End: 2019-12-13 | Stop reason: HOSPADM

## 2019-12-13 RX ORDER — PROMETHAZINE HYDROCHLORIDE 25 MG/ML
6.25 INJECTION, SOLUTION INTRAMUSCULAR; INTRAVENOUS
Status: DISCONTINUED | OUTPATIENT
Start: 2019-12-13 | End: 2019-12-13 | Stop reason: HOSPADM

## 2019-12-13 RX ORDER — DIPHENHYDRAMINE HYDROCHLORIDE 50 MG/ML
25 INJECTION INTRAMUSCULAR; INTRAVENOUS EVERY 4 HOURS PRN
Status: DISCONTINUED | OUTPATIENT
Start: 2019-12-13 | End: 2019-12-15 | Stop reason: HOSPADM

## 2019-12-13 RX ORDER — DIPHENHYDRAMINE HCL 25 MG
25 CAPSULE ORAL EVERY 4 HOURS PRN
Status: DISCONTINUED | OUTPATIENT
Start: 2019-12-13 | End: 2019-12-15 | Stop reason: HOSPADM

## 2019-12-13 RX ORDER — METHYLERGONOVINE MALEATE 0.2 MG/ML
INJECTION INTRAVENOUS AS NEEDED
Status: DISCONTINUED | OUTPATIENT
Start: 2019-12-13 | End: 2019-12-15 | Stop reason: HOSPADM

## 2019-12-13 RX ORDER — NALOXONE HCL 0.4 MG/ML
0.2 VIAL (ML) INJECTION
Status: DISCONTINUED | OUTPATIENT
Start: 2019-12-13 | End: 2019-12-15 | Stop reason: HOSPADM

## 2019-12-13 RX ORDER — ONDANSETRON 2 MG/ML
4 INJECTION INTRAMUSCULAR; INTRAVENOUS ONCE AS NEEDED
Status: DISCONTINUED | OUTPATIENT
Start: 2019-12-13 | End: 2019-12-15 | Stop reason: HOSPADM

## 2019-12-13 RX ORDER — PROMETHAZINE HYDROCHLORIDE 25 MG/ML
12.5 INJECTION, SOLUTION INTRAMUSCULAR; INTRAVENOUS EVERY 6 HOURS PRN
Status: DISCONTINUED | OUTPATIENT
Start: 2019-12-13 | End: 2019-12-15 | Stop reason: HOSPADM

## 2019-12-13 RX ORDER — OXYTOCIN/0.9 % SODIUM CHLORIDE 30/500 ML
85 PLASTIC BAG, INJECTION (ML) INTRAVENOUS ONCE
Status: DISCONTINUED | OUTPATIENT
Start: 2019-12-13 | End: 2019-12-13 | Stop reason: SDUPTHER

## 2019-12-13 RX ORDER — LANOLIN 100 %
OINTMENT (GRAM) TOPICAL
Status: DISCONTINUED | OUTPATIENT
Start: 2019-12-13 | End: 2019-12-15 | Stop reason: HOSPADM

## 2019-12-13 RX ORDER — ONDANSETRON 2 MG/ML
4 INJECTION INTRAMUSCULAR; INTRAVENOUS ONCE AS NEEDED
Status: DISCONTINUED | OUTPATIENT
Start: 2019-12-13 | End: 2019-12-13

## 2019-12-13 RX ORDER — IBUPROFEN 600 MG/1
600 TABLET ORAL EVERY 8 HOURS PRN
Status: DISCONTINUED | OUTPATIENT
Start: 2019-12-13 | End: 2019-12-15 | Stop reason: HOSPADM

## 2019-12-13 RX ORDER — KETAMINE HYDROCHLORIDE 100 MG/ML
INJECTION INTRAMUSCULAR; INTRAVENOUS AS NEEDED
Status: DISCONTINUED | OUTPATIENT
Start: 2019-12-13 | End: 2019-12-13 | Stop reason: SURG

## 2019-12-13 RX ORDER — OXYTOCIN/0.9 % SODIUM CHLORIDE 30/500 ML
650 PLASTIC BAG, INJECTION (ML) INTRAVENOUS ONCE
Status: DISCONTINUED | OUTPATIENT
Start: 2019-12-13 | End: 2019-12-13 | Stop reason: SDUPTHER

## 2019-12-13 RX ORDER — OXYTOCIN/0.9 % SODIUM CHLORIDE 30/500 ML
85 PLASTIC BAG, INJECTION (ML) INTRAVENOUS ONCE
Status: DISCONTINUED | OUTPATIENT
Start: 2019-12-13 | End: 2019-12-15 | Stop reason: HOSPADM

## 2019-12-13 RX ORDER — PHYTONADIONE 1 MG/.5ML
INJECTION, EMULSION INTRAMUSCULAR; INTRAVENOUS; SUBCUTANEOUS
Status: DISPENSED
Start: 2019-12-13 | End: 2019-12-13

## 2019-12-13 RX ORDER — PROMETHAZINE HYDROCHLORIDE 25 MG/ML
12.5 INJECTION, SOLUTION INTRAMUSCULAR; INTRAVENOUS EVERY 4 HOURS PRN
Status: DISCONTINUED | OUTPATIENT
Start: 2019-12-13 | End: 2019-12-15 | Stop reason: HOSPADM

## 2019-12-13 RX ORDER — BUPIVACAINE HCL/0.9 % NACL/PF 0.1 %
2 PLASTIC BAG, INJECTION (ML) EPIDURAL EVERY 8 HOURS
Status: COMPLETED | OUTPATIENT
Start: 2019-12-13 | End: 2019-12-13

## 2019-12-13 RX ORDER — OXYTOCIN/0.9 % SODIUM CHLORIDE 30/500 ML
PLASTIC BAG, INJECTION (ML) INTRAVENOUS
Status: DISPENSED
Start: 2019-12-13 | End: 2019-12-13

## 2019-12-13 RX ORDER — CARBOPROST TROMETHAMINE 250 UG/ML
250 INJECTION, SOLUTION INTRAMUSCULAR AS NEEDED
Status: DISCONTINUED | OUTPATIENT
Start: 2019-12-13 | End: 2019-12-13 | Stop reason: HOSPADM

## 2019-12-13 RX ORDER — MISOPROSTOL 200 UG/1
800 TABLET ORAL AS NEEDED
Status: DISCONTINUED | OUTPATIENT
Start: 2019-12-13 | End: 2019-12-13 | Stop reason: HOSPADM

## 2019-12-13 RX ORDER — OXYCODONE AND ACETAMINOPHEN 7.5; 325 MG/1; MG/1
1 TABLET ORAL EVERY 4 HOURS PRN
Status: DISCONTINUED | OUTPATIENT
Start: 2019-12-13 | End: 2019-12-15 | Stop reason: HOSPADM

## 2019-12-13 RX ORDER — ALUMINA, MAGNESIA, AND SIMETHICONE 2400; 2400; 240 MG/30ML; MG/30ML; MG/30ML
15 SUSPENSION ORAL EVERY 4 HOURS PRN
Status: DISCONTINUED | OUTPATIENT
Start: 2019-12-13 | End: 2019-12-15 | Stop reason: HOSPADM

## 2019-12-13 RX ORDER — METHYLERGONOVINE MALEATE 0.2 MG/ML
200 INJECTION INTRAVENOUS ONCE AS NEEDED
Status: DISCONTINUED | OUTPATIENT
Start: 2019-12-13 | End: 2019-12-13 | Stop reason: HOSPADM

## 2019-12-13 RX ORDER — FENTANYL CITRATE 50 UG/ML
INJECTION, SOLUTION INTRAMUSCULAR; INTRAVENOUS AS NEEDED
Status: DISCONTINUED | OUTPATIENT
Start: 2019-12-13 | End: 2019-12-13 | Stop reason: SURG

## 2019-12-13 RX ADMIN — SODIUM CHLORIDE, POTASSIUM CHLORIDE, SODIUM LACTATE AND CALCIUM CHLORIDE 100 ML: 600; 310; 30; 20 INJECTION, SOLUTION INTRAVENOUS at 00:10

## 2019-12-13 RX ADMIN — HYDROMORPHONE HYDROCHLORIDE 0.5 MG: 1 INJECTION, SOLUTION INTRAMUSCULAR; INTRAVENOUS; SUBCUTANEOUS at 05:38

## 2019-12-13 RX ADMIN — OXYCODONE HYDROCHLORIDE AND ACETAMINOPHEN 1 TABLET: 7.5; 325 TABLET ORAL at 09:32

## 2019-12-13 RX ADMIN — IBUPROFEN 600 MG: 600 TABLET ORAL at 18:32

## 2019-12-13 RX ADMIN — ONDANSETRON 4 MG: 2 INJECTION INTRAMUSCULAR; INTRAVENOUS at 15:20

## 2019-12-13 RX ADMIN — DOCUSATE SODIUM 100 MG: 100 CAPSULE, LIQUID FILLED ORAL at 09:30

## 2019-12-13 RX ADMIN — ONDANSETRON 4 MG: 2 INJECTION INTRAMUSCULAR; INTRAVENOUS at 06:43

## 2019-12-13 RX ADMIN — HYDROMORPHONE HYDROCHLORIDE 0.5 MG: 1 INJECTION, SOLUTION INTRAMUSCULAR; INTRAVENOUS; SUBCUTANEOUS at 01:27

## 2019-12-13 RX ADMIN — OXYCODONE HYDROCHLORIDE AND ACETAMINOPHEN 1 TABLET: 7.5; 325 TABLET ORAL at 15:07

## 2019-12-13 RX ADMIN — OXYCODONE HYDROCHLORIDE AND ACETAMINOPHEN 1 TABLET: 7.5; 325 TABLET ORAL at 20:27

## 2019-12-13 RX ADMIN — SIMETHICONE CHEW TAB 80 MG 80 MG: 80 TABLET ORAL at 12:34

## 2019-12-13 RX ADMIN — SODIUM CHLORIDE, POTASSIUM CHLORIDE, SODIUM LACTATE AND CALCIUM CHLORIDE 500 ML: 600; 310; 30; 20 INJECTION, SOLUTION INTRAVENOUS at 00:01

## 2019-12-13 RX ADMIN — SODIUM CHLORIDE, POTASSIUM CHLORIDE, SODIUM LACTATE AND CALCIUM CHLORIDE 500 ML: 600; 310; 30; 20 INJECTION, SOLUTION INTRAVENOUS at 00:45

## 2019-12-13 RX ADMIN — OXYTOCIN-SODIUM CHLORIDE 0.9% IV SOLN 30 UNIT/500ML 40 ML: 30-0.9/5 SOLUTION at 00:32

## 2019-12-13 RX ADMIN — MIDAZOLAM HYDROCHLORIDE 2 MG: 2 INJECTION, SOLUTION INTRAMUSCULAR; INTRAVENOUS at 00:29

## 2019-12-13 RX ADMIN — FENTANYL CITRATE 100 MCG: 50 INJECTION, SOLUTION INTRAMUSCULAR; INTRAVENOUS at 00:16

## 2019-12-13 RX ADMIN — KETAMINE HYDROCHLORIDE 30 MG: 100 INJECTION INTRAMUSCULAR; INTRAVENOUS at 00:32

## 2019-12-13 RX ADMIN — SODIUM CHLORIDE 2 G: 9 INJECTION, SOLUTION INTRAVENOUS at 06:47

## 2019-12-13 RX ADMIN — MIDAZOLAM HYDROCHLORIDE 1 MG: 2 INJECTION, SOLUTION INTRAMUSCULAR; INTRAVENOUS at 00:09

## 2019-12-13 RX ADMIN — SODIUM CHLORIDE 2 G: 9 INJECTION, SOLUTION INTRAVENOUS at 15:07

## 2019-12-13 RX ADMIN — HYDROMORPHONE HYDROCHLORIDE 1 MG: 1 INJECTION, SOLUTION INTRAMUSCULAR; INTRAVENOUS; SUBCUTANEOUS at 02:19

## 2019-12-13 RX ADMIN — PRENATAL VIT W/ FE FUMARATE-FA TAB 27-0.8 MG 1 TABLET: 27-0.8 TAB at 09:30

## 2019-12-13 RX ADMIN — IBUPROFEN 600 MG: 600 TABLET ORAL at 09:32

## 2019-12-13 RX ADMIN — KETAMINE HYDROCHLORIDE 20 MG: 100 INJECTION INTRAMUSCULAR; INTRAVENOUS at 00:41

## 2019-12-13 NOTE — OP NOTE
Section Procedure Note    Emily Dean    2019     Indications: previous delivery type   (LTCS), patient presented in active labor with ruptured membranes at 35 weeks    Pre-operative Diagnosis: 29-year-old  at 35 weeks and 3 days with  premature rupture of membranes and active labor.  Previous  section desiring repeat and tubal ligation    Post-operative Diagnosis: Same as above    PROCEDURES PERFORMED: Procedure(s):   SECTION REPEAT WITH TUBAL    SURGEON: Aashish Wren DO, FACOG    ASSISTANT: Bea Kessler CSA    STAFF:   Circulator: Sonya Allan RN  Scrub Person: Kori Richardson RN  L & D Nurse: Liz Washington RN; Andre Hayden RN  NICU Nurse: Taylor Galvin RN  Assistant: Bea Kessler CSA    ANESTHESIA: Spinal    ANESTHESIA STAFF:  No anesthesia staff entered.    Findings: Female infant in the cephalic presentation.  Significant lower uterine segment adhesive disease from the anterior abdominal wall down to the lower uterine segment.  Patient would only be a candidate for abdominal hysterectomy if what is needed in the future.    Birth Information  YOB: 2019   Time of birth: 11:31 PM   Delivering clinician: Aashish Wren   Sex: female   Delivery type: , Low Transverse   Breech type (if applicable):     Observed anomalies/comments:         Estimated Blood Loss: 1000cc           Drains: Mathis                 Specimens: None               Complications: Significant deserosalization of the uterus with bleeding and laceration up to the left round ligament.           Disposition: Mother Baby Unit           Condition: stable    Procedure Details   After obtaining informed consent, the patient was taken to the operating room where epidural anesthesia was found to be adequate. Preoperative antibiotics were given.  A Mathis catheter and bilateral sequential compression devices were placed.  She was  then prepped and draped in the normal sterile fashion in the dorsal supine position with a left lateral tilt. A final time out was performed. A Pfannenstiel skin incision was then made with the scalpel and carried through to the underlying layer of fascia.  The fascia was incised in the midline and the incision extended laterally with traction.  The rectus muscles were then  in the midline, and the peritoneum was entered digitally.   The peritoneal incision was then extended superiorly and inferiorly omental adhesions were noted at approximately the level of the upper two thirds of the uterus these omental adhesions were taken down sharply with scissors ends were suture ligated and hemostasis was noted.  Attention was turned to the lower uterine segment where significant adhesive disease was noted and extreme difficulty was found trying to dissect down to the  uterine segment.  A high transverse incision was then made through scar tissue.  The uterus was then entered bluntly and the incision extended w/ traction.  And attempt was made to reach the fetal head however it was too deep in the pelvis.  At this time the fetal buttocks was grasped and delivered and the infant was delivered in the breech presentation without difficulty. The infant was handed off to waiting pediatric care team.      The placenta was then removed manually, the uterus exteriorized, and cleared of all clots and debris.  The inner layer of the uterus was closed using 0 Vicryl.  A second layer of myometrium was then closed over the first significant deserosalization was noted.  In extension had gone all the way to the level of the triple pedicle on the patient's left with bleeding noted from that and.  The triple pedicle was suture-ligated to obtain hemostasis.  Once this was complete multiple figure-of-eight #1 chromic sutures throughout the anterior body of the uterus were placed to obtain hemostasis.  Bleeding was noted at about  the level of the triple pedicle on the patient's left and 2-0 chromic on an SH needle was used to ligate likely branches of the uterine artery once this was done hemostasis was noted.  Filshie clips were applied to the fallopian tubes bilaterally.  2 clips were placed on each tube.  The tubes appeared to be completely enclosed within the clamps.  Surgicel powder was then placed over the entire anterior body of the uterus to tubes were used.  With hemostasis again noted the fascia was then closed with looped PDS starting from right to midline and then from left to midline being tied in the midline.  Umair's fascia was closed with 3-0 Vicryl.  The skin incision was then closed with 3-0 Monocryl without difficulty.  The patient tolerated the procedure well.  Sponge, lap, and needle counts were correct times two. Vaginal sweep was completed.  The patient was taken to the recovery room in stable condition.        This document has been electronically signed by Aashish Wren DO on December 13, 2019 1:07 AM        This document has been electronically signed by Aashish Wren DO on December 13, 2019 1:04 AM

## 2019-12-13 NOTE — L&D DELIVERY NOTE
Patient underwent repeat low transverse  section with tubal ligation, please see operative report for full details.

## 2019-12-13 NOTE — PLAN OF CARE
Problem: Patient Care Overview  Goal: Plan of Care Review  Outcome: Ongoing (interventions implemented as appropriate)  Flowsheets (Taken 2019 1630)  Progress: improving  Plan of Care Reviewed With: patient  Outcome Summary: PT HAS AMBULATED IN HALLWAY, PAIN IS CONTROLLED WITH PAIN MEDICATION     Problem: Patient Care Overview  Goal: Individualization and Mutuality  Outcome: Ongoing (interventions implemented as appropriate)  Flowsheets (Taken 2019 1630)  Patient Specific Goals (Include Timeframe): AMBULATE IN HALLWAY TODAY  Patient Specific Interventions: ASSIST WITH AMBULATION  Patient Specific Preferences: IS BOTTLEFEEDING     Problem: Patient Care Overview  Goal: Discharge Needs Assessment  Outcome: Ongoing (interventions implemented as appropriate)  Flowsheets (Taken 2019 1630)  Equipment Currently Used at Home: none  Transportation Anticipated: family or friend will provide  Concerns to be Addressed: no discharge needs identified  Readmission Within the Last 30 Days: no previous admission in last 30 days  Patient/Family Anticipated Services at Transition: none  Patient/Family Anticipates Transition to: home     Problem: Patient Care Overview  Goal: Interprofessional Rounds/Family Conf  Outcome: Ongoing (interventions implemented as appropriate)     Problem: Postpartum ( Delivery) (Adult,Obstetrics,Pediatric)  Goal: Signs and Symptoms of Listed Potential Problems Will be Absent, Minimized or Managed (Postpartum)  Outcome: Ongoing (interventions implemented as appropriate)  Flowsheets (Taken 2019 1630)  Problems Assessed (Postpartum ): all  Problems Present (Postpartum ): none     Problem: Postpartum ( Delivery) (Adult,Obstetrics,Pediatric)  Goal: Anesthesia/Sedation Recovery  Outcome: Ongoing (interventions implemented as appropriate)  Flowsheets (Taken 2019 1630)  Anesthesia/Sedation Recovery: criteria met for discharge

## 2019-12-13 NOTE — ANESTHESIA PROCEDURE NOTES
Spinal Block      Patient location during procedure: OB  Start Time: 12/12/2019 11:05 PM  Stop Time: 12/12/2019 11:08 PM  Indication:at surgeon's request  Performed By  Anesthesiologist: Ventura Dietrich MD  CRNA: Ben Daniel CRNA  Preanesthetic Checklist  Completed: patient identified, site marked, surgical consent, pre-op evaluation, timeout performed, IV checked, risks and benefits discussed and monitors and equipment checked  Spinal Block Prep:  Patient Position:sitting  Sterile Tech:gloves, cap, mask and sterile barriers  Prep:Betadine  Patient Monitoring:blood pressure monitoring, continuous pulse oximetry and EKG  Spinal Block Procedure  Approach:midline  Guidance:palpation technique  Location:L3-L4  Needle Type:Pencan  Needle Gauge:24 G  Placement of Spinal needle event:cerebrospinal fluid aspirated  Paresthesia: no  Fluid Appearance:clear  Medications: bupivacaine PF (MARCAINE) 0.75 % injection, 1.6 mL  Med Administered at 12/12/2019 11:08 PM   Post Assessment  Patient Tolerance:patient tolerated the procedure well with no apparent complications  Complications no

## 2019-12-13 NOTE — PROGRESS NOTES
[facility]  Emily Dean  : 1990  MRN: [id number]  CSN: [id number]    Postpartum Day #1  Subjective   Patient is status post repeat low transverse  section with bilateral tubal ligation with Filshie clips.  Patient had very difficult hysterectomy due to severe extensive adhesive disease with deserosalization of the anterior portion of the uterus.  Patient overall this morning is stable states that she is having increased abdominal pain.  Urine output is clear and yellow although only about 30 cc/h.  Patient did have significant blood loss at almost 1800 mL's per calculation.  Still waiting on morning CBC to see where patient is at.  Patient does have significant amount of pain that is being controlled with IV Dilaudid.     Objective     Min/max vitals past 24 hours:   Temp  Min: 97.2 °F (36.2 °C)  Max: 98.9 °F (37.2 °C)  BP  Min: 117/91  Max: 150/89  Pulse  Min: 79  Max: 101  Pulse  Min: 79  Max: 101        Abdomen: soft, tender to palpation, but no rebound or guarding.  fundus firm and non-tender   Calves: Nontender, no cords palpable   Pelvic: deferred     Lab Results   Component Value Date    WBC 11.90 (H) 2019    HGB 11.4 (L) 2019    HCT 34.9 2019    MCV 84.3 2019     2019    RH Positive 2019    HEPBSAG Non-Reactive 2019        Assessment   1. Postpartum Day #1 S/P repeat low transverse  section.  Patient is overall stable at this time and pain is moderately controlled.     Plan   1. Continue routine postpartum care  2. Continue routine postoperative care  3. CBC this a.m.  4. Vital signs are all stable without tachycardia but will consider transfusion if H&H is significantly low.  5. Will encourage ambulation at approximately the 12-hour diaz with catheter removal.            This document has been electronically signed by Aashish Wren DO on 2019 5:41 AM

## 2019-12-13 NOTE — ANESTHESIA POSTPROCEDURE EVALUATION
Patient: Emily Dean    Procedure Summary     Date:  12/12/19 Room / Location:      Anesthesia Start:  2259 Anesthesia Stop:  12/13/19 0120    Procedure:  c section Diagnosis:      Scheduled Providers:   Provider:  Ventura Dietrich MD    Anesthesia Type:  spinal ASA Status:  2 - Emergent          Anesthesia Type: spinal    Vitals  No vitals data found for the desired time range.          Post Anesthesia Care and Evaluation    Patient location during evaluation: bedside  Patient participation: complete - patient participated  Level of consciousness: awake and alert  Pain score: 0  Pain management: adequate  Airway patency: patent  Anesthetic complications: No anesthetic complications  PONV Status: none  Cardiovascular status: acceptable  Respiratory status: acceptable, spontaneous ventilation, room air and unassisted  Hydration status: acceptable  Post Neuraxial Block status: Motor and sensory function returned to baseline and No signs or symptoms of PDPH

## 2019-12-13 NOTE — H&P
[facility]  Obstetric History and Physical    Chief complaint: Large gush of fluid consistent with water breaking and severe pelvic pain.      Patient is a 29 y.o. female  currently at 35w2d, who presents with complaint of leakage of fluid that occurred approximately 1 hour ago.  Patient felt a large pop with a large gush of fluid, since that time has had severe lower abdominal pain and continued leaking of fluid, denies any vaginal bleeding and has positive fetal movement at this time.    Her prenatal care is complicated by 3 previous C-sections, desires tubal ligation.     Obstetric History   #: 1, Date: 08, Sex: Male, Weight: 3884 g (8 lb 9 oz), GA: 40w0d, Delivery: , Unspecified, Apgar1: None, Apgar5: None, Living: , Birth Comments:  at age 6; scheduled csection due to hydrocephaly    #: 2, Date: 10/11/09, Sex: Female, Weight: 3289 g (7 lb 4 oz), GA: 40w0d, Delivery: , Unspecified, Apgar1: None, Apgar5: None, Living: Living, Birth Comments: None    #: 3, Date: 12/04/15, Sex: Female, Weight: 2608 g (5 lb 12 oz), GA: 35w0d, Delivery: , Unspecified, Apgar1: None, Apgar5: None, Living: Living, Birth Comments: labor onset, ruptured membranes, repeat csection    #: 4, Date: None, Sex: None, Weight: None, GA: None, Delivery: None, Apgar1: None, Apgar5: None, Living: None, Birth Comments: None       The following portions of the patients history were reviewed and updated as appropriate: current medications, allergies, past medical history, past surgical history, past family history, past social history and problem list .       Prenatal Information:  Prenatal Results     POC Urine Glucose/Protein     Test Value Reference Range Date Time    Urine Glucose Negative mg/dL Negative, 1000 mg/dL (3+) 19 1214    Urine Protein 30 mg/dL mg/dL Negative 19 1214          Initial Prenatal Labs     Test Value Reference Range Date Time    Hemoglobin 13.1 g/dL 12.0 -  15.9 06/19/19 1443    Hematocrit 39.0 % 34.0 - 46.6 06/19/19 1443    Platelets 245 10*3/mm3 140 - 450 11/11/19 0957      256 10*3/mm3 140 - 450 06/19/19 1443    Rubella IgG Positive   06/19/19 1443    Hepatitis B SAg Non-Reactive  Non-Reactive 06/19/19 1443    Hepatitis C Ab Non-Reactive  Non-Reactive 06/19/19 1443    RPR Non-Reactive  Non-Reactive 06/19/19 1443    ABO A   06/19/19 1443    Rh Positive   06/19/19 1443    Antibody Screen Negative   06/19/19 1443    HIV Non-Reactive  Non-Reactive 06/19/19 1443    Urine Culture No growth   08/05/19 1024      No growth   06/19/19 1443    Gonorrhea Negative  Negative 06/19/19 1443    Chlamydia Negative  Negative 06/19/19 1443    TSH 2.800 uIU/mL 0.270 - 4.200 10/18/19 0958          2nd and 3rd Trimester     Test Value Reference Range Date Time    Hemoglobin (repeated) 11.2 g/dL 12.0 - 15.9 11/11/19 0957    Hematocrit (repeated) 33.9 % 34.0 - 46.6 11/11/19 0957     mg/dL 60 - 140 11/11/19 0957    Antibody Screen (repeated)        GTT Fasting        GTT 1 Hr        GTT 2 Hr        GTT 3 Hr        Group B Strep Negative  Negative 12/05/19 1000          Drug Screening     Test Value Reference Range Date Time    Amphetamine Screen        Barbiturate Screen Negative  Negative 06/19/19 1443    Benzodiazepine Screen Negative  Negative 06/19/19 1443    Methadone Screen Negative  Negative 06/19/19 1443    Phencyclidine Screen        Opiates Screen Negative  Negative 06/19/19 1443    THC Screen Negative  Negative 06/19/19 1443    Cocaine Screen Negative  Negative 06/19/19 1443    Propoxyphene Screen        Buprenorphine Screen        Methamphetamine Screen        Oxycodone Screen Negative  Negative 06/19/19 1443    Tricyclic Antidepressants Screen              Other (Risk screening)     Test Value Reference Range Date Time    Varicella IgG        Parvovirus IgG        CMV IgG        Cystic Fibrosis        Hemoglobin electrophoresis        NIPT        MSAFP-4        AFP (for  NTD only)                  External Prenatal Results     Pregnancy Outside Results - Transcribed From Office Records - See Scanned Records For Details     Test Value Date Time    Hgb 11.2 g/dL 19 0957      13.1 g/dL 19 1443    Hct 33.9 % 19 0957      39.0 % 19 1443    ABO A  19 1443    Rh Positive  19 1443    Antibody Screen Negative  19 1443    Glucose Fasting GTT       Glucose Tolerance Test 1 hour       Glucose Tolerance Test 3 hour       Gonorrhea (discrete) Negative  19 1443    Chlamydia (discrete) Negative  19 1443    RPR Non-Reactive  19 1443    VDRL       Syphilis Antibody       Rubella Positive  19 1443    HBsAg Non-Reactive  19 1443    Herpes Simplex Virus PCR       Herpes Simplex VIrus Culture       HIV Non-Reactive  19 1443    Hep C RNA Quant PCR       Hep C Antibody Non-Reactive  19 1443    AFP       Group B Strep Negative  19 1000    GBS Susceptibility to Clindamycin       GBS Susceptibility to Erythromycin       Fetal Fibronectin       Genetic Testing, Maternal Blood             Drug Screening     Test Value Date Time    Urine Drug Screen       Amphetamine Screen       Barbiturate Screen Negative  19 1443    Benzodiazepine Screen Negative  19 1443    Methadone Screen Negative  19 1443    Phencyclidine Screen       Opiates Screen Negative  19 1443    THC Screen Negative  19 1443    Cocaine Screen       Propoxyphene Screen       Buprenorphine Screen       Methamphetamine Screen       Oxycodone Screen Negative  19 1443    Tricyclic Antidepressants Screen                    Past OB History:     OB History    Para Term  AB Living   4 3 2 1 0 2   SAB TAB Ectopic Molar Multiple Live Births   0 0 0 0 0 3      # Outcome Date GA Lbr Chema/2nd Weight Sex Delivery Anes PTL Lv   4 Current            3  12/04/15 35w0d  2608 g (5 lb 12 oz) F CS-Unspec Spinal  BERNABE      Birth  Comments: labor onset, ruptured membranes, repeat csection      Complications: Premature rupture of membranes, History of    2 Term 10/11/09 40w0d  3289 g (7 lb 4 oz) F CS-Unspec Spinal N BERNABE      Complications: History of    1 Term 08 40w0d  3884 g (8 lb 9 oz) M CS-Unspec Spinal N DEC      Birth Comments:  at age 6; scheduled csection due to hydrocephaly      Complications: Hydrocephalus (CMS/HCC)        ALLERGIES:   No Known Allergies     Home Medications:     Prior to Admission medications    Medication Sig Start Date End Date Taking? Authorizing Provider   acetaminophen (TYLENOL) 325 MG tablet Take 325 mg by mouth Every 6 (Six) Hours As Needed for Mild Pain .    Provider, MD Amparo   PRENATAL GUMMY VITAMIN Chew 1 each Daily.    Provider, MD Amparo       Past Medical History: Past Medical History:   Diagnosis Date   • Congenital heart defect    • Family history of cleft lip    • Family history of congenital hydrocephalus    • History of  delivery    • History of  labor       Past Surgical History Past Surgical History:   Procedure Laterality Date   • CARDIAC SURGERY     • CARDIAC VALVE SURGERY N/A     3 open vaulves at birth   •  SECTION      3 prior csections      Family History: Family History   Problem Relation Age of Onset   • COPD Mother    • Cancer Mother         lung   • Heart attack Father    • No Known Problems Sister    • Hydrocephalus Son    • ADD / ADHD Daughter    • Heart attack Maternal Grandmother    • Lung cancer Maternal Grandfather    • No Known Problems Daughter       Social History:  reports that she has never smoked. She has never used smokeless tobacco.   reports that she does not drink alcohol.   reports that she does not use drugs.        Review of Systems                                                                                                                  Neuro no history of brain tumor    HENT no history of ear  tumors    Eye no history of retinal tumors    Pulmonary no history of lung tumors    Cardiac no history of cardiac tumors    GI: No history of small bowel tumors    Musculoskeletal: No history of skeletal muscle tumors    Endocrine: No history of adrenal tumors    Lymphatic: No history of Hodgkin's disease    Renal: No history of renal cancer      Objective     There were no vitals filed for this visit.    OBGyn Exam  Constitutional: Appears to be in no acute distress; Eyes: sclera normal; Endocrine system: thyroid palpate is normal; Pulmonary system: lungs clear; Cardiovascular system: heart regular rate and rhythm; Gastrointestinal system: abdomen soft nontender, active bowel sounds; Urologic system: CVA negative; Psychiatric: appropriate insight; Neurologic: gait within normal limits.  Cervix 4/90/-2 significant amount of fluid noted.  Category 1 fetal heart rate tracing with contractions every 3 to 5 minutes.      Last Labs  Lab Results   Component Value Date    WBC 10.57 2019    RBC 4.01 2019    HGB 11.2 (L) 2019    HCT 33.9 (L) 2019    MCV 84.5 2019    MCH 27.9 2019    MCHC 33.0 2019    RDW 13.8 2019    RDWSD 43.0 2019    MPV 11.5 2019     2019        Lab Results   Component Value Date    GLUCOSE 92 10/18/2019    BUN 7 10/18/2019    CREATININE 0.56 (L) 10/18/2019     10/18/2019    K 4.5 10/18/2019     10/18/2019    CO2 26.8 10/18/2019    CALCIUM 8.6 10/18/2019    PROTEINTOT 6.8 10/18/2019    ALBUMIN 3.80 10/18/2019    ALT 9 10/18/2019    AST 12 10/18/2019    ALKPHOS 105 10/18/2019    BILITOT 0.3 10/18/2019    EGFRIFNONA 128 10/18/2019    GLOB 3.0 10/18/2019    AGRATIO 1.3 10/18/2019    BCR 12.5 10/18/2019    ANIONGAP 7.2 10/18/2019       No results found for: HCGQUAL      Assessment/Plan:  1. 29 y.o. [id number].  Patient with grossly ruptured membranes, and in active labor.  Given the amount of pain that patient is having  also some concern for uterine rupture versus placental abruption although fetal monitoring is currently reassuring.  Plan to move forward with emergent  section secondary to concerns for abruption or uterine rupture.  Patient does desire tubal ligation.      Emily Dean and I have discussed pain goals for this hospitalization after reviewing her current clinical condition, medical history and prior pain experiences.  The goal is to keep her pain level tolerable.  To help achieve this, I plan to spinal anesthesia during  section.           This document has been electronically signed by Aashish Wren DO on 2019 10:26 PM

## 2019-12-13 NOTE — ANESTHESIA PREPROCEDURE EVALUATION
Anesthesia Evaluation     no history of anesthetic complications:  NPO Solid Status: > 8 hours  NPO Liquid Status: > 2 hours           Airway   Mallampati: II  TM distance: >3 FB  Neck ROM: full  Possible difficult intubation  Dental - normal exam     Pulmonary - negative pulmonary ROS    breath sounds clear to auscultation  (-) not a smoker  Cardiovascular     ECG reviewed  Rhythm: regular  Rate: abnormal    (+) valvular problems/murmurs,     ROS comment: Normal sinus rhythm  Possible Left atrial enlargement  Borderline ECG  No previous ECGs available  Confirmed by SULTAN    · Left ventricular systolic function is normal.  · Estimated EF appears to be in the range of 51 - 55%.  · No conclusive evidence of ASD/VSD on color doppler.    States born with congenital heart defect and surgically repaired.    Neuro/Psych  (-) seizures  GI/Hepatic/Renal/Endo      Musculoskeletal     (+) back pain,   Abdominal   (+) obese,    Substance History      OB/GYN    (+) Pregnant,     Comment:       Other            Phys Exam Other: gravid                Anesthesia Plan    ASA 2 - emergent     spinal     intravenous induction     Anesthetic plan, all risks, benefits, and alternatives have been provided, discussed and informed consent has been obtained with: patient and spouse/significant other.

## 2019-12-13 NOTE — ANESTHESIA PREPROCEDURE EVALUATION
Anesthesia Evaluation     Patient summary reviewed and Nursing notes reviewed   NPO Solid Status: > 8 hours  NPO Liquid Status: > 6 hours           Airway   Dental      Pulmonary    Cardiovascular         Neuro/Psych  GI/Hepatic/Renal/Endo    (+) obesity, morbid obesity,      Musculoskeletal     Abdominal    Substance History      OB/GYN    (+) Pregnant,         Other                        Anesthesia Plan

## 2019-12-14 LAB
BASOPHILS # BLD AUTO: 0.01 10*3/MM3 (ref 0–0.2)
BASOPHILS NFR BLD AUTO: 0.1 % (ref 0–1.5)
DEPRECATED RDW RBC AUTO: 45.4 FL (ref 37–54)
DEPRECATED RDW RBC AUTO: 46.1 FL (ref 37–54)
EOSINOPHIL # BLD AUTO: 0.02 10*3/MM3 (ref 0–0.4)
EOSINOPHIL NFR BLD AUTO: 0.1 % (ref 0.3–6.2)
ERYTHROCYTE [DISTWIDTH] IN BLOOD BY AUTOMATED COUNT: 15 % (ref 12.3–15.4)
ERYTHROCYTE [DISTWIDTH] IN BLOOD BY AUTOMATED COUNT: 15.1 % (ref 12.3–15.4)
HCT VFR BLD AUTO: 21.4 % (ref 34–46.6)
HCT VFR BLD AUTO: 22.3 % (ref 34–46.6)
HGB BLD-MCNC: 7.1 G/DL (ref 12–15.9)
HGB BLD-MCNC: 7.2 G/DL (ref 12–15.9)
IMM GRANULOCYTES # BLD AUTO: 0.13 10*3/MM3 (ref 0–0.05)
IMM GRANULOCYTES NFR BLD AUTO: 0.9 % (ref 0–0.5)
LYMPHOCYTES # BLD AUTO: 1 10*3/MM3 (ref 0.7–3.1)
LYMPHOCYTES NFR BLD AUTO: 6.8 % (ref 19.6–45.3)
MCH RBC QN AUTO: 27.7 PG (ref 26.6–33)
MCH RBC QN AUTO: 28 PG (ref 26.6–33)
MCHC RBC AUTO-ENTMCNC: 32.3 G/DL (ref 31.5–35.7)
MCHC RBC AUTO-ENTMCNC: 33.2 G/DL (ref 31.5–35.7)
MCV RBC AUTO: 84.3 FL (ref 79–97)
MCV RBC AUTO: 85.8 FL (ref 79–97)
MONOCYTES # BLD AUTO: 0.82 10*3/MM3 (ref 0.1–0.9)
MONOCYTES NFR BLD AUTO: 5.6 % (ref 5–12)
NEUTROPHILS # BLD AUTO: 12.62 10*3/MM3 (ref 1.7–7)
NEUTROPHILS NFR BLD AUTO: 86.5 % (ref 42.7–76)
NRBC BLD AUTO-RTO: 0 /100 WBC (ref 0–0.2)
PLATELET # BLD AUTO: 159 10*3/MM3 (ref 140–450)
PLATELET # BLD AUTO: 165 10*3/MM3 (ref 140–450)
PMV BLD AUTO: 11.8 FL (ref 6–12)
PMV BLD AUTO: 11.9 FL (ref 6–12)
RBC # BLD AUTO: 2.54 10*6/MM3 (ref 3.77–5.28)
RBC # BLD AUTO: 2.6 10*6/MM3 (ref 3.77–5.28)
WBC NRBC COR # BLD: 14.6 10*3/MM3 (ref 3.4–10.8)
WBC NRBC COR # BLD: 15.05 10*3/MM3 (ref 3.4–10.8)

## 2019-12-14 PROCEDURE — 85027 COMPLETE CBC AUTOMATED: CPT | Performed by: OBSTETRICS & GYNECOLOGY

## 2019-12-14 PROCEDURE — 85025 COMPLETE CBC W/AUTO DIFF WBC: CPT | Performed by: OBSTETRICS & GYNECOLOGY

## 2019-12-14 PROCEDURE — 99231 SBSQ HOSP IP/OBS SF/LOW 25: CPT | Performed by: OBSTETRICS & GYNECOLOGY

## 2019-12-14 RX ORDER — PRENATAL VIT/IRON FUM/FOLIC AC 27MG-0.8MG
1 TABLET ORAL DAILY
Qty: 90 TABLET | Refills: 3 | Status: SHIPPED | OUTPATIENT
Start: 2019-12-14 | End: 2020-10-01

## 2019-12-14 RX ORDER — OXYCODONE AND ACETAMINOPHEN 7.5; 325 MG/1; MG/1
1 TABLET ORAL EVERY 4 HOURS PRN
Qty: 30 TABLET | Refills: 0 | Status: SHIPPED | OUTPATIENT
Start: 2019-12-14 | End: 2019-12-23

## 2019-12-14 RX ORDER — BISACODYL 10 MG
10 SUPPOSITORY, RECTAL RECTAL DAILY PRN
Status: DISCONTINUED | OUTPATIENT
Start: 2019-12-14 | End: 2019-12-15 | Stop reason: HOSPADM

## 2019-12-14 RX ORDER — IBUPROFEN 600 MG/1
600 TABLET ORAL EVERY 8 HOURS PRN
Qty: 60 TABLET | Refills: 3 | Status: SHIPPED | OUTPATIENT
Start: 2019-12-14 | End: 2020-10-01

## 2019-12-14 RX ORDER — PSEUDOEPHEDRINE HCL 30 MG
100 TABLET ORAL 2 TIMES DAILY
Qty: 90 EACH | Refills: 3 | Status: SHIPPED | OUTPATIENT
Start: 2019-12-14 | End: 2020-01-07

## 2019-12-14 RX ADMIN — OXYCODONE HYDROCHLORIDE AND ACETAMINOPHEN 1 TABLET: 7.5; 325 TABLET ORAL at 04:59

## 2019-12-14 RX ADMIN — MAGNESIUM HYDROXIDE 10 ML: 2400 SUSPENSION ORAL at 07:31

## 2019-12-14 RX ADMIN — DOCUSATE SODIUM 100 MG: 100 CAPSULE, LIQUID FILLED ORAL at 21:14

## 2019-12-14 RX ADMIN — IBUPROFEN 600 MG: 600 TABLET ORAL at 13:17

## 2019-12-14 RX ADMIN — OXYCODONE HYDROCHLORIDE AND ACETAMINOPHEN 1 TABLET: 7.5; 325 TABLET ORAL at 22:13

## 2019-12-14 RX ADMIN — IBUPROFEN 600 MG: 600 TABLET ORAL at 04:59

## 2019-12-14 RX ADMIN — IBUPROFEN 600 MG: 600 TABLET ORAL at 21:14

## 2019-12-14 RX ADMIN — OXYCODONE HYDROCHLORIDE AND ACETAMINOPHEN 1 TABLET: 7.5; 325 TABLET ORAL at 18:08

## 2019-12-14 RX ADMIN — OXYCODONE HYDROCHLORIDE AND ACETAMINOPHEN 1 TABLET: 7.5; 325 TABLET ORAL at 09:07

## 2019-12-14 RX ADMIN — SIMETHICONE CHEW TAB 80 MG 80 MG: 80 TABLET ORAL at 18:08

## 2019-12-14 RX ADMIN — PRENATAL VIT W/ FE FUMARATE-FA TAB 27-0.8 MG 1 TABLET: 27-0.8 TAB at 08:23

## 2019-12-14 RX ADMIN — POLYETHYLENE GLYCOL 3350 17 G: 17 POWDER, FOR SOLUTION ORAL at 07:30

## 2019-12-14 RX ADMIN — OXYCODONE HYDROCHLORIDE AND ACETAMINOPHEN 1 TABLET: 7.5; 325 TABLET ORAL at 13:17

## 2019-12-14 RX ADMIN — DOCUSATE SODIUM 100 MG: 100 CAPSULE, LIQUID FILLED ORAL at 08:24

## 2019-12-14 RX ADMIN — SIMETHICONE CHEW TAB 80 MG 80 MG: 80 TABLET ORAL at 13:18

## 2019-12-14 NOTE — PLAN OF CARE
Problem: Patient Care Overview  Goal: Plan of Care Review  Outcome: Ongoing (interventions implemented as appropriate)  Flowsheets (Taken 12/14/2019 8089)  Progress: improving  Plan of Care Reviewed With: patient  Outcome Summary: Tokk MOM, Mylicon, and Golden this am plus amulated in [place], passed some gas now, using incentive every hour while awake, and pain more controlled this evening, will f/u with Dr Wren

## 2019-12-14 NOTE — PROGRESS NOTES
[facility]  Emily Dean  : 1990  MRN: [id number]  CSN: [id number]    Postpartum Day #2  Subjective   Patient is status post repeat low transverse  section with bilateral tubal ligation with Filshie clips.  Patient had very difficult  section due to severe extensive adhesive disease with deserosalization of the anterior portion of the uterus.  Patient overall this morning is stable. States that pain last night was well controlled overall, but got behind on her pain meds this AM and is subsequently having some increased pain. CBC this AM with downtrending WBC, but significant anemia with H/H of . Pt denies dizziness/lightheadedness with ambulation. Declines transfusion at this time.      Objective     Min/max vitals past 24 hours:   Temp  Min: 98.5 °F (36.9 °C)  Max: 99.5 °F (37.5 °C)  BP  Min: 111/59  Max: 134/80  Pulse  Min: 70  Max: 108  Pulse  Min: 70  Max: 108        Abdomen: soft, tender to palpation, but no rebound or guarding.  fundus firm and non-tender   Calves: Nontender, no cords palpable   Pelvic: deferred     Lab Results   Component Value Date    WBC 17.66 (H) 2019    HGB 10.0 (L) 2019    HCT 30.5 (L) 2019    MCV 84.3 2019     2019    RH Positive 2019    HEPBSAG Non-Reactive 2019     WBC  3.40 - 10.80 10*3/mm3 14.60High     RBC  3.77 - 5.28 10*6/mm3 2.54Low     Hemoglobin  12.0 - 15.9 g/dL 7.1Low     Comment: SPECIMEN REANALYZED TO CONFIRM HGB RESULTS   Hematocrit  34.0 - 46.6 % 21.4Low     MCV  79.0 - 97.0 fL 84.3    MCH  26.6 - 33.0 pg 28.0    MCHC  31.5 - 35.7 g/dL 33.2    RDW  12.3 - 15.4 % 15.1    RDW-SD  37.0 - 54.0 fl 45.4    MPV  6.0 - 12.0 fL 11.9    Platelets  140 - 450 10*3/mm3 159    Neutrophil %  42.7 - 76.0 % 86.5High     Lymphocyte %  19.6 - 45.3 % 6.8Low     Monocyte %  5.0 - 12.0 % 5.6    Eosinophil %  0.3 - 6.2 % 0.1Low     Basophil %  0.0 - 1.5 % 0.1    Immature Grans %  0.0 - 0.5 % 0.9High      Neutrophils, Absolute  1.70 - 7.00 10*3/mm3 12.62High     Lymphocytes, Absolute  0.70 - 3.10 10*3/mm3 1.00    Monocytes, Absolute  0.10 - 0.90 10*3/mm3 0.82    Eosinophils, Absolute  0.00 - 0.40 10*3/mm3 0.02    Basophils, Absolute  0.00 - 0.20 10*3/mm3 0.01    Immature Grans, Absolute  0.00 - 0.05 10*3/mm3 0.13High     nRBC  0.0 - 0.2 /100 WBC 0.0    Resulting Agency [facility]         Specimen Collected: 19 08:02   Last Resulted: 19 08:42                Assessment   1. Postpartum Day #2 S/P repeat low transverse  section.  Patient is overall stable at this time and pain is moderately controlled. Significant anemia, asymptomatic.      Plan   1. Continue routine postpartum care  2. Continue routine postoperative care  3. Repeat CBC this afternoon at 1400.  4. Vital signs are all stable without tachycardia. Offered transfusion this AM, pt declined. Will re-address if symptomatic at all or if continues to drop on CBC this afternoon.   5. Continue to ambulate as tolerated.       This document has been electronically signed by Reji Hennessy MD on 2019 7:00 AM

## 2019-12-14 NOTE — DISCHARGE SUMMARY
[facility]  Emily Dean  : 1990  MRN: [id number]  CSN: [id number]    Discharge Summary      Date of Admission: 2019   Date of Discharge: [id number]   Principle Discharge Dx: 1. Postoperative repeat low transverse  section with tubal ligation   Procedures Performed: Procedure(s):   SECTION REPEAT WITH TUBAL   Brief History: Patient is a 29 y.o. now  who presented to labor and delivery at 35+2wks with ruptured membranes and underwent repeat  section with filshie clip tubal, complicated by extensive adhesive disease. .   Hospital Course: Her post-operative course was unremarkable.  On POD # 2 she expressed the desire for D/C. She had no febrile morbidity. She had passed gas, and was urinating normally. She was eating a regular diet without difficulty. She was ambulating well. Her incision was clean, dry and intact. Discharge instructions were given.  All questions were answered        Condition:  Discharge Activity:  Discharge Diet: Stable   as tolerated  Regular   Discharge Medications:    Your medication list      START taking these medications      Instructions Last Dose Given Next Dose Due   docusate sodium 100 MG capsule      Take 100 mg by mouth 2 (Two) Times a Day.       ibuprofen 600 MG tablet  Commonly known as:  ADVIL,MOTRIN      Take 1 tablet by mouth Every 8 (Eight) Hours As Needed for Mild Pain .       oxyCODONE-acetaminophen 7.5-325 MG per tablet  Commonly known as:  PERCOCET      Take 1 tablet by mouth Every 4 (Four) Hours As Needed for Moderate Pain  for up to 9 days.       prenatal vitamin 27-0.8 27-0.8 MG tablet tablet      Take 1 tablet by mouth Daily.          ASK your doctor about these medications      Instructions Last Dose Given Next Dose Due   acetaminophen 325 MG tablet  Commonly known as:  TYLENOL      Take 325 mg by mouth Every 6 (Six) Hours As Needed for Mild Pain .       PRENATAL GUMMY VITAMIN      Chew 1 each Daily.             Where  to Get Your Medications      You can get these medications from any pharmacy    Bring a paper prescription for each of these medications  · docusate sodium 100 MG capsule  · ibuprofen 600 MG tablet  · oxyCODONE-acetaminophen 7.5-325 MG per tablet  · prenatal vitamin 27-0.8 27-0.8 MG tablet tablet        Discharge Disposition: home   Follow-up: Future Appointments   Date Time Provider Department Center   12/17/2019  2:15 PM Aashish Wren DO [facility] None   12/19/2019 10:00 AM Aashish Wren DO [facility] None   12/26/2019 11:15 AM Aashish Wren DO [facility] None   1/2/2020 11:00 AM Aashish Wren DO [facility] None   1/9/2020 10:00 AM Aashish Wren DO [facility] None      Please call and schedule a postoperative appointment with Dr. Wren in 1-2 weeks.     This document has been electronically signed by Reji Hennessy MD on December 14, 2019 7:36 AM

## 2019-12-15 VITALS
OXYGEN SATURATION: 98 % | TEMPERATURE: 98 F | HEART RATE: 92 BPM | RESPIRATION RATE: 18 BRPM | DIASTOLIC BLOOD PRESSURE: 82 MMHG | SYSTOLIC BLOOD PRESSURE: 127 MMHG

## 2019-12-15 LAB
BASOPHILS # BLD AUTO: 0.02 10*3/MM3 (ref 0–0.2)
BASOPHILS # BLD AUTO: 0.03 10*3/MM3 (ref 0–0.2)
BASOPHILS # BLD AUTO: 0.03 10*3/MM3 (ref 0–0.2)
BASOPHILS NFR BLD AUTO: 0.2 % (ref 0–1.5)
DEPRECATED RDW RBC AUTO: 46.5 FL (ref 37–54)
DEPRECATED RDW RBC AUTO: 46.7 FL (ref 37–54)
DEPRECATED RDW RBC AUTO: 47 FL (ref 37–54)
EOSINOPHIL # BLD AUTO: 0.08 10*3/MM3 (ref 0–0.4)
EOSINOPHIL # BLD AUTO: 0.11 10*3/MM3 (ref 0–0.4)
EOSINOPHIL # BLD AUTO: 0.15 10*3/MM3 (ref 0–0.4)
EOSINOPHIL NFR BLD AUTO: 0.6 % (ref 0.3–6.2)
EOSINOPHIL NFR BLD AUTO: 1 % (ref 0.3–6.2)
EOSINOPHIL NFR BLD AUTO: 1.2 % (ref 0.3–6.2)
ERYTHROCYTE [DISTWIDTH] IN BLOOD BY AUTOMATED COUNT: 14.9 % (ref 12.3–15.4)
ERYTHROCYTE [DISTWIDTH] IN BLOOD BY AUTOMATED COUNT: 15.2 % (ref 12.3–15.4)
ERYTHROCYTE [DISTWIDTH] IN BLOOD BY AUTOMATED COUNT: 15.4 % (ref 12.3–15.4)
HCT VFR BLD AUTO: 20.6 % (ref 34–46.6)
HCT VFR BLD AUTO: 21.1 % (ref 34–46.6)
HCT VFR BLD AUTO: 26.2 % (ref 34–46.6)
HGB BLD-MCNC: 6.5 G/DL (ref 12–15.9)
HGB BLD-MCNC: 6.9 G/DL (ref 12–15.9)
HGB BLD-MCNC: 8.6 G/DL (ref 12–15.9)
HOLD SPECIMEN: NORMAL
IMM GRANULOCYTES # BLD AUTO: 0.09 10*3/MM3 (ref 0–0.05)
IMM GRANULOCYTES # BLD AUTO: 0.13 10*3/MM3 (ref 0–0.05)
IMM GRANULOCYTES # BLD AUTO: 0.17 10*3/MM3 (ref 0–0.05)
IMM GRANULOCYTES NFR BLD AUTO: 0.8 % (ref 0–0.5)
IMM GRANULOCYTES NFR BLD AUTO: 0.9 % (ref 0–0.5)
IMM GRANULOCYTES NFR BLD AUTO: 1.4 % (ref 0–0.5)
LYMPHOCYTES # BLD AUTO: 1.31 10*3/MM3 (ref 0.7–3.1)
LYMPHOCYTES # BLD AUTO: 1.39 10*3/MM3 (ref 0.7–3.1)
LYMPHOCYTES # BLD AUTO: 1.48 10*3/MM3 (ref 0.7–3.1)
LYMPHOCYTES NFR BLD AUTO: 11.5 % (ref 19.6–45.3)
LYMPHOCYTES NFR BLD AUTO: 13 % (ref 19.6–45.3)
LYMPHOCYTES NFR BLD AUTO: 9.3 % (ref 19.6–45.3)
MCH RBC QN AUTO: 27.1 PG (ref 26.6–33)
MCH RBC QN AUTO: 28 PG (ref 26.6–33)
MCH RBC QN AUTO: 28.4 PG (ref 26.6–33)
MCHC RBC AUTO-ENTMCNC: 31.6 G/DL (ref 31.5–35.7)
MCHC RBC AUTO-ENTMCNC: 32.7 G/DL (ref 31.5–35.7)
MCHC RBC AUTO-ENTMCNC: 32.8 G/DL (ref 31.5–35.7)
MCV RBC AUTO: 85.8 FL (ref 79–97)
MCV RBC AUTO: 85.8 FL (ref 79–97)
MCV RBC AUTO: 86.5 FL (ref 79–97)
MONOCYTES # BLD AUTO: 0.56 10*3/MM3 (ref 0.1–0.9)
MONOCYTES # BLD AUTO: 0.64 10*3/MM3 (ref 0.1–0.9)
MONOCYTES # BLD AUTO: 0.77 10*3/MM3 (ref 0.1–0.9)
MONOCYTES NFR BLD AUTO: 4.6 % (ref 5–12)
MONOCYTES NFR BLD AUTO: 5.5 % (ref 5–12)
MONOCYTES NFR BLD AUTO: 5.6 % (ref 5–12)
NEUTROPHILS # BLD AUTO: 11.78 10*3/MM3 (ref 1.7–7)
NEUTROPHILS # BLD AUTO: 9.05 10*3/MM3 (ref 1.7–7)
NEUTROPHILS # BLD AUTO: 9.76 10*3/MM3 (ref 1.7–7)
NEUTROPHILS NFR BLD AUTO: 79.4 % (ref 42.7–76)
NEUTROPHILS NFR BLD AUTO: 81.1 % (ref 42.7–76)
NEUTROPHILS NFR BLD AUTO: 83.5 % (ref 42.7–76)
NRBC BLD AUTO-RTO: 0 /100 WBC (ref 0–0.2)
NRBC BLD AUTO-RTO: 0 /100 WBC (ref 0–0.2)
NRBC BLD AUTO-RTO: 0.5 /100 WBC (ref 0–0.2)
PLATELET # BLD AUTO: 176 10*3/MM3 (ref 140–450)
PLATELET # BLD AUTO: 191 10*3/MM3 (ref 140–450)
PLATELET # BLD AUTO: 213 10*3/MM3 (ref 140–450)
PMV BLD AUTO: 10.9 FL (ref 6–12)
PMV BLD AUTO: 11.4 FL (ref 6–12)
PMV BLD AUTO: 11.6 FL (ref 6–12)
RBC # BLD AUTO: 2.4 10*6/MM3 (ref 3.77–5.28)
RBC # BLD AUTO: 2.46 10*6/MM3 (ref 3.77–5.28)
RBC # BLD AUTO: 3.03 10*6/MM3 (ref 3.77–5.28)
WBC NRBC COR # BLD: 11.39 10*3/MM3 (ref 3.4–10.8)
WBC NRBC COR # BLD: 12.06 10*3/MM3 (ref 3.4–10.8)
WBC NRBC COR # BLD: 14.1 10*3/MM3 (ref 3.4–10.8)

## 2019-12-15 PROCEDURE — P9016 RBC LEUKOCYTES REDUCED: HCPCS

## 2019-12-15 PROCEDURE — 63710000001 DIPHENHYDRAMINE PER 50 MG: Performed by: OBSTETRICS & GYNECOLOGY

## 2019-12-15 PROCEDURE — 36430 TRANSFUSION BLD/BLD COMPNT: CPT

## 2019-12-15 PROCEDURE — 86900 BLOOD TYPING SEROLOGIC ABO: CPT

## 2019-12-15 PROCEDURE — 85025 COMPLETE CBC W/AUTO DIFF WBC: CPT | Performed by: OBSTETRICS & GYNECOLOGY

## 2019-12-15 PROCEDURE — 99231 SBSQ HOSP IP/OBS SF/LOW 25: CPT | Performed by: OBSTETRICS & GYNECOLOGY

## 2019-12-15 RX ORDER — ACETAMINOPHEN 650 MG/1
650 SUPPOSITORY RECTAL ONCE
Status: COMPLETED | OUTPATIENT
Start: 2019-12-15 | End: 2019-12-15

## 2019-12-15 RX ORDER — ACETAMINOPHEN 325 MG/1
650 TABLET ORAL ONCE
Status: COMPLETED | OUTPATIENT
Start: 2019-12-15 | End: 2019-12-15

## 2019-12-15 RX ORDER — FERROUS SULFATE TAB EC 324 MG (65 MG FE EQUIVALENT) 324 (65 FE) MG
324 TABLET DELAYED RESPONSE ORAL 2 TIMES DAILY WITH MEALS
Status: DISCONTINUED | OUTPATIENT
Start: 2019-12-15 | End: 2019-12-15 | Stop reason: HOSPADM

## 2019-12-15 RX ORDER — DIPHENHYDRAMINE HCL 25 MG
25 CAPSULE ORAL ONCE
Status: COMPLETED | OUTPATIENT
Start: 2019-12-15 | End: 2019-12-15

## 2019-12-15 RX ADMIN — FERROUS SULFATE TAB EC 324 MG (65 MG FE EQUIVALENT) 324 MG: 324 (65 FE) TABLET DELAYED RESPONSE at 09:11

## 2019-12-15 RX ADMIN — OXYCODONE HYDROCHLORIDE AND ACETAMINOPHEN 1 TABLET: 7.5; 325 TABLET ORAL at 15:49

## 2019-12-15 RX ADMIN — POLYETHYLENE GLYCOL 3350 17 G: 17 POWDER, FOR SOLUTION ORAL at 06:33

## 2019-12-15 RX ADMIN — FERROUS SULFATE TAB EC 324 MG (65 MG FE EQUIVALENT) 324 MG: 324 (65 FE) TABLET DELAYED RESPONSE at 02:16

## 2019-12-15 RX ADMIN — OXYCODONE HYDROCHLORIDE AND ACETAMINOPHEN 1 TABLET: 7.5; 325 TABLET ORAL at 06:27

## 2019-12-15 RX ADMIN — OXYCODONE HYDROCHLORIDE AND ACETAMINOPHEN 1 TABLET: 7.5; 325 TABLET ORAL at 02:16

## 2019-12-15 RX ADMIN — DOCUSATE SODIUM 100 MG: 100 CAPSULE, LIQUID FILLED ORAL at 09:04

## 2019-12-15 RX ADMIN — OXYCODONE HYDROCHLORIDE AND ACETAMINOPHEN 1 TABLET: 7.5; 325 TABLET ORAL at 10:43

## 2019-12-15 RX ADMIN — IBUPROFEN 600 MG: 600 TABLET ORAL at 06:27

## 2019-12-15 RX ADMIN — PRENATAL VIT W/ FE FUMARATE-FA TAB 27-0.8 MG 1 TABLET: 27-0.8 TAB at 09:04

## 2019-12-15 RX ADMIN — SIMETHICONE CHEW TAB 80 MG 80 MG: 80 TABLET ORAL at 02:16

## 2019-12-15 RX ADMIN — IBUPROFEN 600 MG: 600 TABLET ORAL at 15:49

## 2019-12-15 RX ADMIN — ACETAMINOPHEN 650 MG: 325 TABLET, FILM COATED ORAL at 08:29

## 2019-12-15 RX ADMIN — MAGNESIUM HYDROXIDE 10 ML: 2400 SUSPENSION ORAL at 06:33

## 2019-12-15 RX ADMIN — DIPHENHYDRAMINE HYDROCHLORIDE 25 MG: 25 CAPSULE ORAL at 08:29

## 2019-12-15 NOTE — PROGRESS NOTES
[facility]  Emily Dean  : 1990  MRN: [id number]  CSN: [id number]    Postpartum Day #3  Subjective   Patient is status post repeat low transverse  section with bilateral tubal ligation with Filshie clips.  Patient had very difficult  section due to severe extensive adhesive disease with deserosalization of the anterior portion of the uterus.  Patient overall this morning is stable. Reports pain well controlled. Some dizziness/SOB with ambulation last night. Pt denies at rest. Refused transfusion yesterday and last night, but is amenable to blood transfusion this AM. Significant anemia with H/H of 6.5/20.      Objective     Min/max vitals past 24 hours:   Temp  Min: 98.2 °F (36.8 °C)  Max: 100.1 °F (37.8 °C)  BP  Min: 107/67  Max: 139/70  Pulse  Min: 91  Max: 101  Pulse  Min: 91  Max: 101        Abdomen: soft, tender to palpation, but no rebound or guarding.  fundus firm and non-tender   Calves: Nontender, no cords palpable   Pelvic: deferred     Lab Results   Component Value Date    WBC 11.39 (H) 12/15/2019    HGB 6.5 (C) 12/15/2019    HCT 20.6 (C) 12/15/2019    MCV 85.8 12/15/2019     12/15/2019    RH Positive 2019    HEPBSAG Non-Reactive 2019     Specimen Collected: 19 08:02   Last Resulted: 19 08:42                Assessment   1. Postpartum Day #3 S/P repeat low transverse  section.  Patient is overall stable at this time and pain is controlled. Significant anemia, symptomatic.      Plan   1. Continue routine postpartum care  2. Continue routine postoperative care  3. tranfuse 2U PRBC's this AM.  4. Benadryl/tylenol prior to transfusion  5. Continue to ambulate as tolerated. \  6. Repeat CBC 4 hrs after transfusion complete. If H/H at least 24, plan to DC home this afternoon.         This document has been electronically signed by Reji Hennessy MD on December 15, 2019 7:26 AM

## 2019-12-15 NOTE — NURSING NOTE
Dr Hennessy on unit.  I discussed rate for infusion of blood products based on patient being young, healthy and current assessment.  Ordered given to increase blood transfusion to 200ml/hr if patient is tolerating well and has no s/s of reaction, or issues.

## 2019-12-15 NOTE — PLAN OF CARE
VSS, up ad makenzie ambulating in hallway, FF -2, lochia light. PRN Percocet and Motrin taken for pain. Passing flatus. Repeat CBC this AM.

## 2019-12-15 NOTE — NURSING NOTE
Dr. Hennessy called unit with results of CBC. Hgb 6.9. States to offer for patient to receive a blood transfusion of 2 units, he believes patient would feel better if taken but it was ok if she did not want to do the transfusion. Will order Iron PO BID.    Informed patient of CBC results and reviewed prior results and low Hgb could be the cause to her dizziness and shortness of breath. Informed patient that a blood transfusion could make her feel better and educated on the blood transfusion process. States she would like to wait on the transfusion tonight and see how she feels in the morning.

## 2019-12-16 ENCOUNTER — DOCUMENTATION (OUTPATIENT)
Dept: OBSTETRICS AND GYNECOLOGY | Facility: HOSPITAL | Age: 29
End: 2019-12-16

## 2019-12-16 LAB
ABO + RH BLD: NORMAL
ABO + RH BLD: NORMAL
BH BB BLOOD EXPIRATION DATE: NORMAL
BH BB BLOOD EXPIRATION DATE: NORMAL
BH BB BLOOD TYPE BARCODE: 6200
BH BB BLOOD TYPE BARCODE: 6200
BH BB DISPENSE STATUS: NORMAL
BH BB DISPENSE STATUS: NORMAL
BH BB PRODUCT CODE: NORMAL
BH BB PRODUCT CODE: NORMAL
BH BB UNIT NUMBER: NORMAL
BH BB UNIT NUMBER: NORMAL
UNIT  ABO: NORMAL
UNIT  ABO: NORMAL
UNIT  RH: NORMAL
UNIT  RH: NORMAL

## 2019-12-17 ENCOUNTER — APPOINTMENT (OUTPATIENT)
Dept: GENERAL RADIOLOGY | Facility: HOSPITAL | Age: 29
End: 2019-12-17

## 2019-12-17 ENCOUNTER — APPOINTMENT (OUTPATIENT)
Dept: CT IMAGING | Facility: HOSPITAL | Age: 29
End: 2019-12-17

## 2019-12-17 ENCOUNTER — APPOINTMENT (OUTPATIENT)
Dept: CARDIOLOGY | Facility: HOSPITAL | Age: 29
End: 2019-12-17

## 2019-12-17 ENCOUNTER — HOSPITAL ENCOUNTER (OUTPATIENT)
Facility: HOSPITAL | Age: 29
Setting detail: OBSERVATION
Discharge: HOME | End: 2019-12-18
Attending: INTERNAL MEDICINE | Admitting: INTERNAL MEDICINE

## 2019-12-17 PROBLEM — Q21.10 ASD (ATRIAL SEPTAL DEFECT): Chronic | Status: ACTIVE | Noted: 2019-12-17

## 2019-12-17 PROBLEM — R06.02 SHORTNESS OF BREATH: Status: ACTIVE | Noted: 2019-12-17

## 2019-12-17 PROBLEM — R60.9 PERIPHERAL EDEMA: Status: ACTIVE | Noted: 2019-12-17

## 2019-12-17 PROBLEM — D64.9 CHRONIC ANEMIA: Chronic | Status: ACTIVE | Noted: 2019-12-17

## 2019-12-17 PROBLEM — Q21.12 PFO (PATENT FORAMEN OVALE): Chronic | Status: ACTIVE | Noted: 2019-12-17

## 2019-12-17 LAB
ABO GROUP BLD: NORMAL
ALBUMIN SERPL-MCNC: 2.6 G/DL (ref 3.5–5.2)
ALBUMIN/GLOB SERPL: 0.8 G/DL
ALP SERPL-CCNC: 182 U/L (ref 39–117)
ALT SERPL W P-5'-P-CCNC: 13 U/L (ref 1–33)
ANION GAP SERPL CALCULATED.3IONS-SCNC: 14 MMOL/L (ref 5–15)
AST SERPL-CCNC: 18 U/L (ref 1–32)
BASOPHILS # BLD AUTO: 0.03 10*3/MM3 (ref 0–0.2)
BASOPHILS NFR BLD AUTO: 0.3 % (ref 0–1.5)
BH CV ECHO MEAS - ACS: 2.6 CM
BH CV ECHO MEAS - AO ROOT AREA (BSA CORRECTED): 1.5
BH CV ECHO MEAS - AO ROOT AREA: 8.6 CM^2
BH CV ECHO MEAS - AO ROOT DIAM: 3.3 CM
BH CV ECHO MEAS - ASC AORTA: 3 CM
BH CV ECHO MEAS - BSA(HAYCOCK): 2.3 M^2
BH CV ECHO MEAS - BSA(HAYCOCK): 2.4 M^2
BH CV ECHO MEAS - BSA: 2.2 M^2
BH CV ECHO MEAS - BSA: 2.2 M^2
BH CV ECHO MEAS - BZI_BMI: 39.9 KILOGRAMS/M^2
BH CV ECHO MEAS - BZI_BMI: 42 KILOGRAMS/M^2
BH CV ECHO MEAS - BZI_METRIC_HEIGHT: 167.6 CM
BH CV ECHO MEAS - BZI_METRIC_HEIGHT: 167.6 CM
BH CV ECHO MEAS - BZI_METRIC_WEIGHT: 112 KG
BH CV ECHO MEAS - BZI_METRIC_WEIGHT: 117.9 KG
BH CV ECHO MEAS - EDV(CUBED): 143.1 ML
BH CV ECHO MEAS - EDV(TEICH): 131.2 ML
BH CV ECHO MEAS - EF(CUBED): 69.3 %
BH CV ECHO MEAS - EF(TEICH): 60.4 %
BH CV ECHO MEAS - ESV(CUBED): 44 ML
BH CV ECHO MEAS - ESV(TEICH): 51.9 ML
BH CV ECHO MEAS - FS: 32.5 %
BH CV ECHO MEAS - IVS/LVPW: 0.99
BH CV ECHO MEAS - IVSD: 0.92 CM
BH CV ECHO MEAS - LV MASS(C)D: 177.2 GRAMS
BH CV ECHO MEAS - LV MASS(C)DI: 81 GRAMS/M^2
BH CV ECHO MEAS - LVIDD: 5.2 CM
BH CV ECHO MEAS - LVIDS: 3.5 CM
BH CV ECHO MEAS - LVOT AREA (M): 4.2 CM^2
BH CV ECHO MEAS - LVOT AREA: 4.2 CM^2
BH CV ECHO MEAS - LVOT DIAM: 2.3 CM
BH CV ECHO MEAS - LVPWD: 0.93 CM
BH CV ECHO MEAS - RVDD: 3.2 CM
BH CV ECHO MEAS - SI(CUBED): 45.3 ML/M^2
BH CV ECHO MEAS - SI(TEICH): 36.3 ML/M^2
BH CV ECHO MEAS - SV(CUBED): 99.1 ML
BH CV ECHO MEAS - SV(TEICH): 79.3 ML
BILIRUB SERPL-MCNC: 0.4 MG/DL (ref 0.2–1.2)
BLD GP AB SCN SERPL QL: NEGATIVE
BUN BLD-MCNC: 8 MG/DL (ref 6–20)
BUN/CREAT SERPL: 13.1 (ref 7–25)
CALCIUM SPEC-SCNC: 8.4 MG/DL (ref 8.6–10.5)
CHLORIDE SERPL-SCNC: 104 MMOL/L (ref 98–107)
CHOLEST SERPL-MCNC: 229 MG/DL (ref 0–200)
CO2 SERPL-SCNC: 24 MMOL/L (ref 22–29)
CREAT BLD-MCNC: 0.61 MG/DL (ref 0.57–1)
D-DIMER, QUANTITATIVE (MAD,POW, STR): >4000 NG/ML (FEU) (ref 0–470)
DEPRECATED RDW RBC AUTO: 46 FL (ref 37–54)
EOSINOPHIL # BLD AUTO: 0.16 10*3/MM3 (ref 0–0.4)
EOSINOPHIL NFR BLD AUTO: 1.6 % (ref 0.3–6.2)
ERYTHROCYTE [DISTWIDTH] IN BLOOD BY AUTOMATED COUNT: 15.2 % (ref 12.3–15.4)
GFR SERPL CREATININE-BSD FRML MDRD: 116 ML/MIN/1.73
GLOBULIN UR ELPH-MCNC: 3.4 GM/DL
GLUCOSE BLD-MCNC: 88 MG/DL (ref 65–99)
HCT VFR BLD AUTO: 26.1 % (ref 34–46.6)
HDLC SERPL-MCNC: 51 MG/DL (ref 40–60)
HGB BLD-MCNC: 8.4 G/DL (ref 12–15.9)
IMM GRANULOCYTES # BLD AUTO: 0.13 10*3/MM3 (ref 0–0.05)
IMM GRANULOCYTES NFR BLD AUTO: 1.3 % (ref 0–0.5)
LDLC SERPL CALC-MCNC: 148 MG/DL (ref 0–100)
LDLC/HDLC SERPL: 2.9 {RATIO}
LV EF 2D ECHO EST: 61 %
LYMPHOCYTES # BLD AUTO: 1.34 10*3/MM3 (ref 0.7–3.1)
LYMPHOCYTES NFR BLD AUTO: 13.6 % (ref 19.6–45.3)
Lab: NORMAL
MAGNESIUM SERPL-MCNC: 2 MG/DL (ref 1.6–2.6)
MAXIMAL PREDICTED HEART RATE: 191 BPM
MAXIMAL PREDICTED HEART RATE: 191 BPM
MCH RBC QN AUTO: 27.4 PG (ref 26.6–33)
MCHC RBC AUTO-ENTMCNC: 32.2 G/DL (ref 31.5–35.7)
MCV RBC AUTO: 85 FL (ref 79–97)
MONOCYTES # BLD AUTO: 0.66 10*3/MM3 (ref 0.1–0.9)
MONOCYTES NFR BLD AUTO: 6.7 % (ref 5–12)
NEUTROPHILS # BLD AUTO: 7.54 10*3/MM3 (ref 1.7–7)
NEUTROPHILS NFR BLD AUTO: 76.5 % (ref 42.7–76)
NRBC BLD AUTO-RTO: 0.5 /100 WBC (ref 0–0.2)
NT-PROBNP SERPL-MCNC: 213.4 PG/ML (ref 5–450)
PHOSPHATE SERPL-MCNC: 4.8 MG/DL (ref 2.5–4.5)
PLATELET # BLD AUTO: 302 10*3/MM3 (ref 140–450)
PMV BLD AUTO: 9.9 FL (ref 6–12)
POTASSIUM BLD-SCNC: 3.6 MMOL/L (ref 3.5–5.2)
PROT SERPL-MCNC: 6 G/DL (ref 6–8.5)
RBC # BLD AUTO: 3.07 10*6/MM3 (ref 3.77–5.28)
RH BLD: POSITIVE
SODIUM BLD-SCNC: 142 MMOL/L (ref 136–145)
STRESS TARGET HR: 162 BPM
STRESS TARGET HR: 162 BPM
T&S EXPIRATION DATE: NORMAL
TRIGL SERPL-MCNC: 151 MG/DL (ref 0–150)
TROPONIN T SERPL-MCNC: <0.01 NG/ML (ref 0–0.03)
TROPONIN T SERPL-MCNC: <0.01 NG/ML (ref 0–0.03)
TSH SERPL DL<=0.05 MIU/L-ACNC: 2.93 UIU/ML (ref 0.27–4.2)
TSH SERPL DL<=0.05 MIU/L-ACNC: 3 UIU/ML (ref 0.27–4.2)
VLDLC SERPL-MCNC: 30.2 MG/DL
WBC NRBC COR # BLD: 9.86 10*3/MM3 (ref 3.4–10.8)

## 2019-12-17 PROCEDURE — G0378 HOSPITAL OBSERVATION PER HR: HCPCS

## 2019-12-17 PROCEDURE — 99244 OFF/OP CNSLTJ NEW/EST MOD 40: CPT | Performed by: INTERNAL MEDICINE

## 2019-12-17 PROCEDURE — 84100 ASSAY OF PHOSPHORUS: CPT | Performed by: INTERNAL MEDICINE

## 2019-12-17 PROCEDURE — 93005 ELECTROCARDIOGRAM TRACING: CPT | Performed by: INTERNAL MEDICINE

## 2019-12-17 PROCEDURE — 93325 DOPPLER ECHO COLOR FLOW MAPG: CPT | Performed by: INTERNAL MEDICINE

## 2019-12-17 PROCEDURE — 86901 BLOOD TYPING SEROLOGIC RH(D): CPT | Performed by: NURSE PRACTITIONER

## 2019-12-17 PROCEDURE — 83735 ASSAY OF MAGNESIUM: CPT | Performed by: INTERNAL MEDICINE

## 2019-12-17 PROCEDURE — 84484 ASSAY OF TROPONIN QUANT: CPT | Performed by: INTERNAL MEDICINE

## 2019-12-17 PROCEDURE — 36430 TRANSFUSION BLD/BLD COMPNT: CPT

## 2019-12-17 PROCEDURE — 94799 UNLISTED PULMONARY SVC/PX: CPT

## 2019-12-17 PROCEDURE — G0379 DIRECT REFER HOSPITAL OBSERV: HCPCS

## 2019-12-17 PROCEDURE — 96374 THER/PROPH/DIAG INJ IV PUSH: CPT

## 2019-12-17 PROCEDURE — 80061 LIPID PANEL: CPT | Performed by: INTERNAL MEDICINE

## 2019-12-17 PROCEDURE — P9016 RBC LEUKOCYTES REDUCED: HCPCS

## 2019-12-17 PROCEDURE — 94760 N-INVAS EAR/PLS OXIMETRY 1: CPT

## 2019-12-17 PROCEDURE — 93308 TTE F-UP OR LMTD: CPT

## 2019-12-17 PROCEDURE — 86850 RBC ANTIBODY SCREEN: CPT | Performed by: NURSE PRACTITIONER

## 2019-12-17 PROCEDURE — 71045 X-RAY EXAM CHEST 1 VIEW: CPT

## 2019-12-17 PROCEDURE — 86900 BLOOD TYPING SEROLOGIC ABO: CPT | Performed by: NURSE PRACTITIONER

## 2019-12-17 PROCEDURE — 93325 DOPPLER ECHO COLOR FLOW MAPG: CPT

## 2019-12-17 PROCEDURE — 83880 ASSAY OF NATRIURETIC PEPTIDE: CPT | Performed by: INTERNAL MEDICINE

## 2019-12-17 PROCEDURE — 84443 ASSAY THYROID STIM HORMONE: CPT | Performed by: INTERNAL MEDICINE

## 2019-12-17 PROCEDURE — 96361 HYDRATE IV INFUSION ADD-ON: CPT

## 2019-12-17 PROCEDURE — 86923 COMPATIBILITY TEST ELECTRIC: CPT

## 2019-12-17 PROCEDURE — 85025 COMPLETE CBC W/AUTO DIFF WBC: CPT | Performed by: INTERNAL MEDICINE

## 2019-12-17 PROCEDURE — 25010000002 FUROSEMIDE PER 20 MG: Performed by: NURSE PRACTITIONER

## 2019-12-17 PROCEDURE — 93308 TTE F-UP OR LMTD: CPT | Performed by: INTERNAL MEDICINE

## 2019-12-17 PROCEDURE — 86900 BLOOD TYPING SEROLOGIC ABO: CPT

## 2019-12-17 PROCEDURE — 80053 COMPREHEN METABOLIC PANEL: CPT | Performed by: INTERNAL MEDICINE

## 2019-12-17 PROCEDURE — 93010 ELECTROCARDIOGRAM REPORT: CPT | Performed by: INTERNAL MEDICINE

## 2019-12-17 PROCEDURE — 85379 FIBRIN DEGRADATION QUANT: CPT | Performed by: NURSE PRACTITIONER

## 2019-12-17 RX ORDER — FUROSEMIDE 10 MG/ML
20 INJECTION INTRAMUSCULAR; INTRAVENOUS ONCE
Status: COMPLETED | OUTPATIENT
Start: 2019-12-17 | End: 2019-12-17

## 2019-12-17 RX ORDER — DOCUSATE SODIUM 100 MG/1
100 CAPSULE, LIQUID FILLED ORAL 2 TIMES DAILY
Status: DISCONTINUED | OUTPATIENT
Start: 2019-12-17 | End: 2019-12-17 | Stop reason: SDUPTHER

## 2019-12-17 RX ORDER — SODIUM CHLORIDE 0.9 % (FLUSH) 0.9 %
10 SYRINGE (ML) INJECTION AS NEEDED
Status: DISCONTINUED | OUTPATIENT
Start: 2019-12-17 | End: 2019-12-18 | Stop reason: HOSPADM

## 2019-12-17 RX ORDER — HEPARIN SODIUM 5000 [USP'U]/ML
5000 INJECTION, SOLUTION INTRAVENOUS; SUBCUTANEOUS EVERY 12 HOURS SCHEDULED
Status: DISCONTINUED | OUTPATIENT
Start: 2019-12-17 | End: 2019-12-18 | Stop reason: HOSPADM

## 2019-12-17 RX ORDER — SODIUM CHLORIDE 0.9 % (FLUSH) 0.9 %
10 SYRINGE (ML) INJECTION EVERY 12 HOURS SCHEDULED
Status: DISCONTINUED | OUTPATIENT
Start: 2019-12-17 | End: 2019-12-18 | Stop reason: HOSPADM

## 2019-12-17 RX ORDER — MORPHINE SULFATE 2 MG/ML
1 INJECTION, SOLUTION INTRAMUSCULAR; INTRAVENOUS EVERY 4 HOURS PRN
Status: DISCONTINUED | OUTPATIENT
Start: 2019-12-17 | End: 2019-12-18 | Stop reason: HOSPADM

## 2019-12-17 RX ORDER — FAMOTIDINE 40 MG/1
40 TABLET, FILM COATED ORAL DAILY
Status: DISCONTINUED | OUTPATIENT
Start: 2019-12-17 | End: 2019-12-18 | Stop reason: HOSPADM

## 2019-12-17 RX ORDER — OXYCODONE AND ACETAMINOPHEN 7.5; 325 MG/1; MG/1
1 TABLET ORAL EVERY 4 HOURS PRN
Status: DISCONTINUED | OUTPATIENT
Start: 2019-12-17 | End: 2019-12-18 | Stop reason: HOSPADM

## 2019-12-17 RX ORDER — ACETAMINOPHEN 160 MG/5ML
650 SOLUTION ORAL EVERY 4 HOURS PRN
Status: DISCONTINUED | OUTPATIENT
Start: 2019-12-17 | End: 2019-12-17

## 2019-12-17 RX ORDER — PRENATAL VIT/IRON FUM/FOLIC AC 27MG-0.8MG
1 TABLET ORAL DAILY
Status: DISCONTINUED | OUTPATIENT
Start: 2019-12-17 | End: 2019-12-18 | Stop reason: HOSPADM

## 2019-12-17 RX ORDER — ONDANSETRON 2 MG/ML
4 INJECTION INTRAMUSCULAR; INTRAVENOUS EVERY 6 HOURS PRN
Status: DISCONTINUED | OUTPATIENT
Start: 2019-12-17 | End: 2019-12-18 | Stop reason: HOSPADM

## 2019-12-17 RX ORDER — NALOXONE HCL 0.4 MG/ML
0.4 VIAL (ML) INJECTION
Status: DISCONTINUED | OUTPATIENT
Start: 2019-12-17 | End: 2019-12-18 | Stop reason: HOSPADM

## 2019-12-17 RX ORDER — ONDANSETRON 4 MG/1
4 TABLET, FILM COATED ORAL EVERY 6 HOURS PRN
Status: DISCONTINUED | OUTPATIENT
Start: 2019-12-17 | End: 2019-12-18 | Stop reason: HOSPADM

## 2019-12-17 RX ORDER — DOCUSATE SODIUM 100 MG/1
100 CAPSULE, LIQUID FILLED ORAL 2 TIMES DAILY
Status: DISCONTINUED | OUTPATIENT
Start: 2019-12-17 | End: 2019-12-18 | Stop reason: HOSPADM

## 2019-12-17 RX ORDER — ACETAMINOPHEN 650 MG/1
650 SUPPOSITORY RECTAL EVERY 4 HOURS PRN
Status: DISCONTINUED | OUTPATIENT
Start: 2019-12-17 | End: 2019-12-18 | Stop reason: HOSPADM

## 2019-12-17 RX ORDER — IBUPROFEN 600 MG/1
600 TABLET ORAL EVERY 8 HOURS PRN
Status: DISCONTINUED | OUTPATIENT
Start: 2019-12-17 | End: 2019-12-18 | Stop reason: HOSPADM

## 2019-12-17 RX ORDER — SODIUM CHLORIDE 9 MG/ML
75 INJECTION, SOLUTION INTRAVENOUS CONTINUOUS
Status: DISCONTINUED | OUTPATIENT
Start: 2019-12-17 | End: 2019-12-17

## 2019-12-17 RX ORDER — ACETAMINOPHEN 325 MG/1
325 TABLET ORAL EVERY 6 HOURS PRN
Status: DISCONTINUED | OUTPATIENT
Start: 2019-12-17 | End: 2019-12-17 | Stop reason: SDUPTHER

## 2019-12-17 RX ORDER — ACETAMINOPHEN 325 MG/1
650 TABLET ORAL EVERY 4 HOURS PRN
Status: DISCONTINUED | OUTPATIENT
Start: 2019-12-17 | End: 2019-12-18 | Stop reason: HOSPADM

## 2019-12-17 RX ADMIN — DOCUSATE SODIUM 100 MG: 100 CAPSULE, LIQUID FILLED ORAL at 08:46

## 2019-12-17 RX ADMIN — SODIUM CHLORIDE, PRESERVATIVE FREE 10 ML: 5 INJECTION INTRAVENOUS at 20:26

## 2019-12-17 RX ADMIN — OXYCODONE HYDROCHLORIDE AND ACETAMINOPHEN 1 TABLET: 7.5; 325 TABLET ORAL at 20:24

## 2019-12-17 RX ADMIN — PRENATAL VIT W/ FE FUMARATE-FA TAB 27-0.8 MG 1 TABLET: 27-0.8 TAB at 09:03

## 2019-12-17 RX ADMIN — OXYCODONE HYDROCHLORIDE AND ACETAMINOPHEN 1 TABLET: 7.5; 325 TABLET ORAL at 08:45

## 2019-12-17 RX ADMIN — OXYCODONE HYDROCHLORIDE AND ACETAMINOPHEN 1 TABLET: 7.5; 325 TABLET ORAL at 13:17

## 2019-12-17 RX ADMIN — FUROSEMIDE 20 MG: 10 INJECTION, SOLUTION INTRAMUSCULAR; INTRAVENOUS at 20:21

## 2019-12-17 RX ADMIN — DOCUSATE SODIUM 100 MG: 100 CAPSULE, LIQUID FILLED ORAL at 20:21

## 2019-12-17 RX ADMIN — SODIUM CHLORIDE, PRESERVATIVE FREE 10 ML: 5 INJECTION INTRAVENOUS at 08:46

## 2019-12-17 RX ADMIN — FAMOTIDINE 40 MG: 40 TABLET ORAL at 08:46

## 2019-12-17 RX ADMIN — SODIUM CHLORIDE 75 ML/HR: 9 INJECTION, SOLUTION INTRAVENOUS at 08:47

## 2019-12-17 NOTE — CONSULTS
[facility] Cardiology Consult    Referring Provider: Terence Agustin MD    Reason for Consultation: dyspnea    Patient Care Team:  Provider, No Known as PCP - General    Chief complaint: shortness of breath      Subjective .     History of present illness: 29-year-old  woman has a past medical history of ASD and VSD unrepaired, PDA repair at age 2, varicose veins. She reports having child by  section on 2019.  She mentions blood loss during  due to complication, hemoglobin at that time was down to 6.5-- so she was given 2units PRBCS.on 12/15/19. Soon after her delivery, she began to experience shortness of breath. She also has bilateral lower extremity edema. She decided to come to the hospital after being woke up last night due to not being able to catch her breath. Denies palpitations, chest pain. Does mention some leg swelling, however mentions this is improving. Additional c/o shortness of breath with exertion. She denies history of chronic anemic.     Reviewed CXR- negative for any acute cardiopulmonary process. Troponin x2 negative. proBNP- negative. TSH- wnl. H/H still marginally low 8.4/26.1 .EKG- nsr with sinus arrhythmia.       The CVD Risk score (D'Agostino, et al., 2008) failed to calculate for the following reasons:    The 2008 CVD risk score is only valid for ages 30 to 74      Review of Systems  Review of Systems   Constitutional: Negative for activity change, chills, fatigue, fever and unexpected weight change.   HENT: Negative for hearing loss, nosebleeds, tinnitus, trouble swallowing and voice change.    Eyes: Negative for visual disturbance.   Respiratory: Positive for chest tightness and shortness of breath. Negative for apnea, cough and wheezing.    Cardiovascular: Positive for leg swelling. Negative for chest pain and palpitations.   Gastrointestinal: Negative for abdominal distention, abdominal pain, blood in stool and constipation.   Endocrine: Negative  for cold intolerance, heat intolerance, polydipsia, polyphagia and polyuria.   Genitourinary: Positive for vaginal bleeding.   Musculoskeletal: Negative for arthralgias and back pain.   Skin: Negative.    Allergic/Immunologic: Negative.    Neurological: Negative for seizures, syncope, speech difficulty, numbness and headaches.   Hematological: Negative for adenopathy. Does not bruise/bleed easily.   Psychiatric/Behavioral: Negative for agitation, behavioral problems and suicidal ideas. The patient is not nervous/anxious.        History  Past Medical History:   Diagnosis Date   • Congenital heart defect    • Family history of cleft lip    • Family history of congenital hydrocephalus    • History of  delivery    • History of  labor    ,   Past Surgical History:   Procedure Laterality Date   • CARDIAC SURGERY     • CARDIAC VALVE SURGERY N/A     3 open vaulves at birth   •  SECTION      3 prior csections   •  SECTION WITH TUBAL N/A 2019    Procedure:  SECTION REPEAT WITH TUBAL;  Surgeon: Aashish Wren DO;  Location: [facility] LABOR DELIVERY;  Service: Obstetrics   ,   Family History   Problem Relation Age of Onset   • COPD Mother    • Cancer Mother         lung   • Heart attack Father    • No Known Problems Sister    • Hydrocephalus Son    • ADD / ADHD Daughter    • Heart attack Maternal Grandmother    • Lung cancer Maternal Grandfather    • No Known Problems Daughter    ,   Social History     Tobacco Use   • Smoking status: Never Smoker   • Smokeless tobacco: Never Used   Substance Use Topics   • Alcohol use: No     Frequency: Never   • Drug use: No   ,   Medications Prior to Admission   Medication Sig Dispense Refill Last Dose   • acetaminophen (TYLENOL) 325 MG tablet Take 325 mg by mouth Every 6 (Six) Hours As Needed for Mild Pain .   Past Month at Unknown time   • docusate sodium 100 MG capsule Take 100 mg by mouth 2 (Two) Times a Day. 90 each 3 2019 at  "Unknown time   • ibuprofen (ADVIL,MOTRIN) 600 MG tablet Take 1 tablet by mouth Every 8 (Eight) Hours As Needed for Mild Pain . 60 tablet 3 12/16/2019 at Unknown time   • PRENATAL GUMMY VITAMIN Chew 1 each Daily.   12/16/2019 at Unknown time   • Prenatal Vit-Fe Fumarate-FA (PRENATAL VITAMIN 27-0.8) 27-0.8 MG tablet tablet Take 1 tablet by mouth Daily. 90 tablet 3 12/16/2019 at Unknown time   • oxyCODONE-acetaminophen (PERCOCET) 7.5-325 MG per tablet Take 1 tablet by mouth Every 4 (Four) Hours As Needed for Moderate Pain  for up to 9 days. 30 tablet 0       ALLERGIES  Patient has no known allergies.    Objective     Vital Sign Min/Max for last 24 hours  Temp  Min: 97.6 °F (36.4 °C)  Max: 99.5 °F (37.5 °C)   BP  Min: 110/57  Max: 137/96   Pulse  Min: 86  Max: 106   Resp  Min: 20  Max: 20   SpO2  Min: 96 %  Max: 99 %   No data recorded   Weight  Min: 112 kg (247 lb)  Max: 118 kg (260 lb 2.3 oz)     Flowsheet Rows      First Filed Value   Admission Height  167.6 cm (66\") Documented at 12/17/2019 0532   Admission Weight  118 kg (260 lb 2.3 oz) Documented at 12/17/2019 0208             Physical Exam:  Physical Exam   Constitutional: She is oriented to person, place, and time. Vital signs are normal. She appears well-developed and well-nourished. No distress.   HENT:   Head: Normocephalic.   Right Ear: External ear normal.   Left Ear: External ear normal.   Eyes: Pupils are equal, round, and reactive to light. EOM and lids are normal.   Neck: No JVD present. Carotid bruit is not present. Edema (trace) present. No tracheal deviation present. No thyromegaly present.   Cardiovascular: Normal rate, regular rhythm and intact distal pulses. Exam reveals no gallop and no friction rub.   Murmur heard.   Systolic murmur is present.  Pulmonary/Chest: Effort normal and breath sounds normal. No stridor. No respiratory distress. She has no wheezes. She has no rales. She exhibits no tenderness.   Abdominal: Soft. Normal appearance and " bowel sounds are normal. She exhibits no distension. There is no tenderness.   Musculoskeletal: She exhibits no edema.     Vascular Status -  Her right foot exhibits normal foot vasculature  and no edema. Her left foot exhibits normal foot vasculature  and no edema.  Neurological: She is alert and oriented to person, place, and time. She has normal reflexes. She displays normal reflexes. No cranial nerve deficit.   Skin: Skin is warm and dry. Capillary refill takes 2 to 3 seconds. No rash noted. She is not diaphoretic. No erythema.   Psychiatric: She has a normal mood and affect. Her behavior is normal. Judgment and thought content normal.   Nursing note and vitals reviewed.         Results Review:     Results from last 7 days   Lab Units 12/17/19  0734   SODIUM mmol/L 142   POTASSIUM mmol/L 3.6   CHLORIDE mmol/L 104   CO2 mmol/L 24.0   BUN mg/dL 8   CREATININE mg/dL 0.61   CALCIUM mg/dL 8.4*   BILIRUBIN mg/dL 0.4   ALK PHOS U/L 182*   ALT (SGPT) U/L 13   AST (SGOT) U/L 18   GLUCOSE mg/dL 88       Estimated Creatinine Clearance: 172.7 mL/min (by C-G formula based on SCr of 0.61 mg/dL).    Results from last 7 days   Lab Units 12/17/19  0734   MAGNESIUM mg/dL 2.0   PHOSPHORUS mg/dL 4.8*       Results from last 7 days   Lab Units 12/17/19  0735 12/15/19  1609 12/15/19  0534 12/14/19  2354 12/14/19  1358   WBC 10*3/mm3 9.86 12.06* 11.39* 14.10* 15.05*   HEMOGLOBIN g/dL 8.4* 8.6* 6.5* 6.9* 7.2*   PLATELETS 10*3/mm3 302 213 176 191 165             Lab Results   Component Value Date    TROPONINT <0.010 12/17/2019     Lab Results   Component Value Date    PROBNP 213.4 12/17/2019       No intake/output data recorded.    Cardiographics  ECG/EMG Results (last 24 hours)     Procedure Component Value Units Date/Time    ECG 12 Lead [[id number]] Collected:  12/17/19 0609     Updated:  12/17/19 0608    Narrative:       Test Reason : shortness of breath  Blood Pressure : **/** mmHG  Vent. Rate : 089 BPM     Atrial Rate : 089 BPM      P-R Int : 172 ms          QRS Dur : 084 ms      QT Int : 368 ms       P-R-T Axes : 033 023 021 degrees     QTc Int : 447 ms    Normal sinus rhythm with sinus arrhythmia  Possible Left atrial enlargement  Possible Anterior infarct , age undetermined  Abnormal ECG  When compared with ECG of 18-OCT-2019 08:37,  No significant change was found    Referred By:             Confirmed By:                   Adult Transthoracic Echo Limited W/ Cont if Necessary Per Protocol [[id number]] Resulted:  12/17/19 1223     Updated:  12/17/19 1226     BSA 2.2 m^2       CV ECHO DEVANTE - RVDD 3.2 cm      IVSd 0.92 cm      LVIDd 5.2 cm      LVIDs 3.5 cm      LVPWd 0.93 cm      IVS/LVPW 0.99     FS 32.5 %      EDV(Teich) 131.2 ml      ESV(Teich) 51.9 ml      EF(Teich) 60.4 %      EDV(cubed) 143.1 ml      ESV(cubed) 44.0 ml      EF(cubed) 69.3 %      LV mass(C)d 177.2 grams      LV mass(C)dI 81.0 grams/m^2      SV(Teich) 79.3 ml      SI(Teich) 36.3 ml/m^2      SV(cubed) 99.1 ml      SI(cubed) 45.3 ml/m^2      Ao root diam 3.3 cm      Ao root area 8.6 cm^2      ACS 2.6 cm      asc Aorta Diam 3.0 cm      LVOT diam 2.3 cm      LVOT area 4.2 cm^2      LVOT area(traced) 4.2 cm^2      Ao root area (BSA corrected) 1.5      CV ECHO DEVANTE - BZI_BMI 39.9 kilograms/m^2       CV ECHO DEVANTE - BSA(HAYCOCK) 2.3 m^2       CV ECHO DEVANTE - BZI_METRIC_WEIGHT 112.0 kg       CV ECHO DEVANTE - BZI_METRIC_HEIGHT 167.6 cm      Target HR (85%) 162 bpm      Max. Pred. HR (100%) 191 bpm      Echo EF Estimated 61 %     Narrative:       · Estimated EF = 61%.  · Left ventricular systolic function is normal.  · 2D Echocardiogram Limited w/ Color. See note. The study is technically   difficult for diagnosis. The quality of the study is limited due to poor   acoustic windows related to patient positioning           Results for orders placed during the hospital encounter of 12/17/19   Adult Transthoracic Echo Limited W/ Cont if Necessary Per Protocol    Narrative ·  Estimated EF = 61%.  · Left ventricular systolic function is normal.  · 2D Echocardiogram Limited w/ Color. See note. The study is technically   difficult for diagnosis. The quality of the study is limited due to poor   acoustic windows related to patient positioning            Xr Chest 1 View    Result Date: 2019  Negative single view chest Electronically signed by:  Ferdinand Suarez MD  2019 7:16 AM CST Workstation: [id number]      Intake/Output     None            Assessment/Plan       Shortness of breath    Chronic anemia    PFO (patent foramen ovale)    Peripheral edema    1. Shortness of breath  -Believe this is s/t anemia as described below. Order Bubble and d-dimer to exclude other cause.   -[facility]-negative for PE, proBNP negative. EKG and troponin negative.   -TTE EF 61%, no LVH, no DD.     1. Acute/chronic anemia  -Patient appears to not have compensated from the blood loss during . H/H marginally low however patient is symptomatic with shortness of breath.   -Type/Screen and transfuse 1 unit PRBCs. Risk and Benefits of blood discussed with patient and she is agreeable.   -Repeat H/H in AM.   -Anemia studies ordered for AM.     2. PFO  -Bubble study. Procedure discussed with patient and she is agreeable    3. Peripheral edema  -Patient is c/o lower leg/pedal swelling. However this is likely r/t pregnancy as the edema is trace. Will continue to monitor.     I discussed the patients findings and my recommendations with patient, nursing staff and consulting provider and Dr. John.     Disposition: Plan for discharge home tomorrow if breathing improved. Recommendation follow up with PCP regarding anemia.               This document has been electronically signed by CHONG Fiore on 2019 3:11 PM

## 2019-12-17 NOTE — PLAN OF CARE
Problem: Patient Care Overview  Goal: Plan of Care Review  Outcome: Ongoing (interventions implemented as appropriate)  Flowsheets (Taken 12/17/2019 0634)  Progress: no change  Plan of Care Reviewed With: patient  Outcome Summary: new admit  Goal: Individualization and Mutuality  Outcome: Ongoing (interventions implemented as appropriate)  Goal: Discharge Needs Assessment  Outcome: Ongoing (interventions implemented as appropriate)  Goal: Interprofessional Rounds/Family Conf  Outcome: Ongoing (interventions implemented as appropriate)     Problem: Breathing Pattern Ineffective (Adult)  Goal: Identify Related Risk Factors and Signs and Symptoms  Outcome: Ongoing (interventions implemented as appropriate)  Goal: Effective Oxygenation/Ventilation  Outcome: Ongoing (interventions implemented as appropriate)  Goal: Anxiety/Fear Reduction  Outcome: Ongoing (interventions implemented as appropriate)

## 2019-12-17 NOTE — PAYOR COMM NOTE
"[facility]  (P)[phone]  (F)[phone]    auth#[id number]        Emily Stacy (29 y.o. Female)     Date of Birth Social Security Number Address Home Phone MRN    1990  [id number]  [facility] [phone] [id number]    Yarsani Marital Status          None Single       Admission Date Admission Type Admitting Provider Attending Provider Department, Room/Bed    19 Aashish Kwon DO  [facility] MOTHER BABY, M754/1    Discharge Date Discharge Disposition Discharge Destination        12/15/2019 Home or Self Care              Attending Provider:  (none)   Allergies:  No Known Allergies    Isolation:  None   Infection:  None   Code Status:  CPR    Ht:  167.6 cm (66\")   Wt:  112 kg (247 lb)    Admission Cmt:  None   Principal Problem:   labor third trimester with  delivery third trimester [O60.14X0] More...                 Active Insurance as of 2019     Primary Coverage     Payor Plan Insurance Group Employer/Plan Group    [employer]HEM MEDICAID ANTH MEDICAID [id number]     Payor Plan Address Payor Plan Phone Number Payor Plan Fax Number Effective Dates    [address] [address] [phone]  2019 - None Entered    [facility] [id number]       Subscriber Name Subscriber Birth Date Member ID       EMILY STACY 1990 [id number]                 Emergency Contacts      (Rel.) Home Phone Work Phone Mobile Phone    Aashish Young (Friend) [phone] -- [phone]               Discharge Summary      Reji Hennessy MD at 19 0736          [facility]  Emily Stacy  : 1990  MRN: [id number]  CSN: [id number]    Discharge Summary      Date of Admission: 2019   Date of Discharge: [id number]   Principle Discharge Dx: 1. Postoperative repeat low transverse  section with tubal ligation   Procedures Performed: Procedure(s):   " SECTION REPEAT WITH TUBAL   Brief History: Patient is a 29 y.o. now  who presented to labor and delivery at 35+2wks with ruptured membranes and underwent repeat  section with filshie clip tubal, complicated by extensive adhesive disease. .   Hospital Course: Her post-operative course was unremarkable.  On POD # 2 she expressed the desire for D/C. She had no febrile morbidity. She had passed gas, and was urinating normally. She was eating a regular diet without difficulty. She was ambulating well. Her incision was clean, dry and intact. Discharge instructions were given.  All questions were answered        Condition:  Discharge Activity:  Discharge Diet: Stable    Regular   Discharge Medications:    Your medication list      START taking these medications      Instructions Last Dose Given Next Dose Due   docusate sodium 100 MG capsule      Take 100 mg by mouth 2 (Two) Times a Day.       ibuprofen 600 MG tablet  Commonly known as:  ADVIL,MOTRIN      Take 1 tablet by mouth Every 8 (Eight) Hours As Needed for Mild Pain .       oxyCODONE-acetaminophen 7.5-325 MG per tablet  Commonly known as:  PERCOCET      Take 1 tablet by mouth Every 4 (Four) Hours As Needed for Moderate Pain  for up to 9 days.       prenatal vitamin 27-0.8 27-0.8 MG tablet tablet      Take 1 tablet by mouth Daily.          ASK your doctor about these medications      Instructions Last Dose Given Next Dose Due   acetaminophen 325 MG tablet  Commonly known as:  TYLENOL      Take 325 mg by mouth Every 6 (Six) Hours As Needed for Mild Pain .       PRENATAL GUMMY VITAMIN      Chew 1 each Daily.             Where to Get Your Medications      You can get these medications from any pharmacy    Bring a paper prescription for each of these medications  · docusate sodium 100 MG capsule  · ibuprofen 600 MG tablet  · oxyCODONE-acetaminophen 7.5-325 MG per tablet  · prenatal vitamin 27-0.8 27-0.8 MG tablet tablet        Discharge Disposition: home    Follow-up: Future Appointments   Date Time Provider Department Center   12/17/2019  2:15 PM Tyra Up, DO [facility] None   12/19/2019 10:00 AM Tyra Up, DO [facility] None   12/26/2019 11:15 AM Tyra Up, DO [facility] None   1/2/2020 11:00 AM Tyra Up, DO [facility] None   1/9/2020 10:00 AM Tyra Up, DO [facility] None      Please call and schedule a postoperative appointment with Dr. Up in 1-2 weeks.     This document has been electronically signed by Pippa Lechuga MD on December 14, 2019 7:36 AM            Electronically signed by Pippa Lechuga MD at 12/14/19 0739       Discharge Order (From admission, onward)     Start     Ordered    12/15/19 1641  Discharge patient  Once     Expected Discharge Date:  12/15/19    Expected Discharge Time:  Afternoon    Discharge Disposition:  Home or Self Care    Physician of Record for Attribution - Please select from Treatment Team:  PIPPA LECHUGA [[id number]]    Review needed by CMO to determine Physician of Record:  No       Question Answer Comment   Physician of Record for Attribution - Please select from Treatment Team PIPPA LECHUGA    Review needed by CMO to determine Physician of Record No        12/15/19 1641    12/14/19 0732  Discharge patient  Once,   Status:  Canceled     Expected Discharge Date:  12/14/19    Discharge Disposition:  Home or Self Care    Physician of Record for Attribution - Please select from Treatment Team:  TYRA UP [[id number]]    Review needed by CMO to determine Physician of Record:  No       Question Answer Comment   Physician of Record for Attribution - Please select from Treatment Team TYRA UP    Review needed by CMO to determine Physician of Record No        12/14/19 0775

## 2019-12-17 NOTE — PROGRESS NOTES
Progress Note  Roberto Barron MD  Hospitalist    Date of visit: 12/17/2019     LOS: 0 days   Patient Care Team:  Provider, No Known as PCP - General    Chief Complaint: shortness of breath    Subjective     Interval History:     Patient Complaints: dyspnea / fatigability     History taken from: patient     Medication Review:   Current Facility-Administered Medications   Medication Dose Route Frequency Provider Last Rate Last Dose   • acetaminophen (TYLENOL) tablet 650 mg  650 mg Oral Q4H PRN Terence Agustin MD        Or   • acetaminophen (TYLENOL) suppository 650 mg  650 mg Rectal Q4H PRN Terence Agustin MD       • docusate sodium (COLACE) capsule 100 mg  100 mg Oral BID Terence Agustin MD   100 mg at 12/17/19 0846   • famotidine (PEPCID) tablet 40 mg  40 mg Oral Daily Terence Agustin MD   40 mg at 12/17/19 0846   • furosemide (LASIX) injection 20 mg  20 mg Intravenous Once Vicki Linn APRN       • heparin (porcine) 5000 UNIT/ML injection 5,000 Units  5,000 Units Subcutaneous Q12H Terence Agustin MD       • ibuprofen (ADVIL,MOTRIN) tablet 600 mg  600 mg Oral Q8H PRN Terence Agustin MD       • morphine injection 1 mg  1 mg Intravenous Q4H PRN Terence Agustin MD        And   • naloxone (NARCAN) injection 0.4 mg  0.4 mg Intravenous Q5 Min PRN Terence Agustin MD       • ondansetron (ZOFRAN) tablet 4 mg  4 mg Oral Q6H PRN Terence Agustin MD        Or   • ondansetron (ZOFRAN) injection 4 mg  4 mg Intravenous Q6H PRN Terence Agustin MD       • oxyCODONE-acetaminophen (PERCOCET) 7.5-325 MG per tablet 1 tablet  1 tablet Oral Q4H PRN Terence Agustin MD   1 tablet at 12/17/19 1317   • prenatal vitamin 27-0.8 tablet 1 tablet  1 tablet Oral Daily Terence Agustin MD   1 tablet at 12/17/19 0903   • sodium chloride 0.9 % flush 10 mL  10 mL Intravenous Q12H Terence Agustin MD   10 mL at 12/17/19 0846   • sodium chloride 0.9 % flush 10 mL  10 mL Intravenous PRN Terence Agustin MD            Review of Systems:   Review of Systems   Constitutional: Positive for fatigue. Negative for fever.   Respiratory: Positive for shortness of breath. Negative for cough, wheezing and stridor.    Cardiovascular: Positive for leg swelling. Negative for chest pain and palpitations.   Gastrointestinal: Negative for abdominal distention, abdominal pain, blood in stool, diarrhea, nausea and vomiting.   Genitourinary: Negative for decreased urine volume, dysuria, flank pain, frequency, genital sores and pelvic pain.   Musculoskeletal: Negative for arthralgias, back pain, joint swelling and neck stiffness.   Skin: Positive for pallor. Negative for color change and rash.   Neurological: Positive for weakness. Negative for tremors, seizures, syncope, speech difficulty, light-headedness and headaches.   Psychiatric/Behavioral: Negative for agitation, behavioral problems and confusion.   All other systems reviewed and are negative.      Objective     Vital Signs  Temp:  [97.6 °F (36.4 °C)-99.5 °F (37.5 °C)] 97.8 °F (36.6 °C)  Heart Rate:  [] 78  Resp:  [20] 20  BP: (110-137)/(57-97) 111/85    Physical Exam:  Physical Exam   Constitutional: She is oriented to person, place, and time. She appears well-developed and well-nourished. No distress.   HENT:   Head: Normocephalic and atraumatic.   Eyes: Pupils are equal, round, and reactive to light. EOM are normal. No scleral icterus.   Neck: Normal range of motion. Neck supple.   Cardiovascular: Normal rate and regular rhythm.   Pulmonary/Chest: Effort normal and breath sounds normal. No stridor. No respiratory distress.   Abdominal: Soft. Bowel sounds are normal. She exhibits no distension. There is no tenderness.   Musculoskeletal: Normal range of motion. She exhibits edema (1 or 2 +). She exhibits no tenderness.   Neurological: She is alert and oriented to person, place, and time. No cranial nerve deficit. Coordination normal.   Skin: Skin is warm and dry. There is  pallor.   Psychiatric: She has a normal mood and affect. Her behavior is normal.   Vitals reviewed.       Results Review:    Lab Results (last 24 hours)     Procedure Component Value Units Date/Time    D-dimer, Quantitative [[id number]]  (Abnormal) Collected:  12/17/19 1509    Specimen:  Blood Updated:  12/17/19 1551     D-Dimer, Quantitative >4,000 ng/mL (FEU)     Narrative:       Dimer values <500 ng/ml FEU are FDA approved as aid in diagnosis of deep venous thrombosis and pulmonary embolism.  This test should not be used in an exclusion strategy with pretest probability alone.    A recent guideline regarding diagnosis for pulmonary thromboembolism recommends an adjusted exclusion criterion of age x 10 ng/ml FEU for patients >50 years of age ([employer] Intern Med 2015; 163: 701-711).      Troponin [[id number]]  (Normal) Collected:  12/17/19 1237    Specimen:  Blood Updated:  12/17/19 1318     Troponin T <0.010 ng/mL     Narrative:       Troponin T Reference Range:  <= 0.03 ng/mL-   Negative for AMI  >0.03 ng/mL-     Abnormal for myocardial necrosis.  Clinicians would have to utilize clinical acumen, EKG, Troponin and serial changes to determine if it is an Acute Myocardial Infarction or myocardial injury due to an underlying chronic condition.     BNP [[id number]]  (Normal) Collected:  12/17/19 0734    Specimen:  Blood Updated:  12/17/19 0810     proBNP 213.4 pg/mL     Narrative:       Among patients with dyspnea, NT-proBNP is highly sensitive for the detection of acute congestive heart failure. In addition NT-proBNP of <300 pg/ml effectively rules out acute congestive heart failure with 99% negative predictive value.      TSH [[id number]]  (Normal) Collected:  12/17/19 0734    Specimen:  Blood Updated:  12/17/19 0810     TSH 3.000 uIU/mL     Troponin [[id number]]  (Normal) Collected:  12/17/19 0734    Specimen:  Blood Updated:  12/17/19 0810     Troponin T <0.010 ng/mL     Narrative:       Troponin T Reference Range:  <=  0.03 ng/mL-   Negative for AMI  >0.03 ng/mL-     Abnormal for myocardial necrosis.  Clinicians would have to utilize clinical acumen, EKG, Troponin and serial changes to determine if it is an Acute Myocardial Infarction or myocardial injury due to an underlying chronic condition.     TSH [[id number]]  (Normal) Collected:  12/17/19 0735    Specimen:  Blood Updated:  12/17/19 0809     TSH 2.930 uIU/mL     Comprehensive Metabolic Panel [[id number]]  (Abnormal) Collected:  12/17/19 0734    Specimen:  Blood Updated:  12/17/19 0808     Glucose 88 mg/dL      BUN 8 mg/dL      Creatinine 0.61 mg/dL      Sodium 142 mmol/L      Potassium 3.6 mmol/L      Chloride 104 mmol/L      CO2 24.0 mmol/L      Calcium 8.4 mg/dL      Total Protein 6.0 g/dL      Albumin 2.60 g/dL      ALT (SGPT) 13 U/L      AST (SGOT) 18 U/L      Alkaline Phosphatase 182 U/L      Total Bilirubin 0.4 mg/dL      eGFR Non African Amer 116 mL/min/1.73      Globulin 3.4 gm/dL      A/G Ratio 0.8 g/dL      BUN/Creatinine Ratio 13.1     Anion Gap 14.0 mmol/L     Narrative:       GFR Normal >60  Chronic Kidney Disease <60  Kidney Failure <15      Lipid Panel [[id number]]  (Abnormal) Collected:  12/17/19 0734    Specimen:  Blood Updated:  12/17/19 0808     Total Cholesterol 229 mg/dL      Triglycerides 151 mg/dL      HDL Cholesterol 51 mg/dL      LDL Cholesterol  148 mg/dL      VLDL Cholesterol 30.2 mg/dL      LDL/HDL Ratio 2.90    Narrative:       Cholesterol Reference Ranges  (U.S. Department of Health and Human Services ATP III Classifications)    Desirable          <200 mg/dL  Borderline High    200-239 mg/dL  High Risk          >240 mg/dL      Triglyceride Reference Ranges  (U.S. Department of Health and Human Services ATP III Classifications)    Normal           <150 mg/dL  Borderline High  150-199 mg/dL  High             200-499 mg/dL  Very High        >500 mg/dL    HDL Reference Ranges  (U.S. Department of Health and Human Services ATP III Classifcations)    Low      <40 mg/dl (major risk factor for CHD)  High    >60 mg/dl ('negative' risk factor for CHD)        LDL Reference Ranges  (U.S. Department of Health and Human Services ATP III Classifcations)    Optimal          <100 mg/dL  Near Optimal     100-129 mg/dL  Borderline High  130-159 mg/dL  High             160-189 mg/dL  Very High        >189 mg/dL    Magnesium [[id number]]  (Normal) Collected:  12/17/19 0734    Specimen:  Blood Updated:  12/17/19 0808     Magnesium 2.0 mg/dL     Phosphorus [[id number]]  (Abnormal) Collected:  12/17/19 0734    Specimen:  Blood Updated:  12/17/19 0808     Phosphorus 4.8 mg/dL     CBC Auto Differential [[id number]]  (Abnormal) Collected:  12/17/19 0735    Specimen:  Blood Updated:  12/17/19 0807     WBC 9.86 10*3/mm3      RBC 3.07 10*6/mm3      Hemoglobin 8.4 g/dL      Hematocrit 26.1 %      MCV 85.0 fL      MCH 27.4 pg      MCHC 32.2 g/dL      RDW 15.2 %      RDW-SD 46.0 fl      MPV 9.9 fL      Platelets 302 10*3/mm3      Neutrophil % 76.5 %      Lymphocyte % 13.6 %      Monocyte % 6.7 %      Eosinophil % 1.6 %      Basophil % 0.3 %      Immature Grans % 1.3 %      Neutrophils, Absolute 7.54 10*3/mm3      Lymphocytes, Absolute 1.34 10*3/mm3      Monocytes, Absolute 0.66 10*3/mm3      Eosinophils, Absolute 0.16 10*3/mm3      Basophils, Absolute 0.03 10*3/mm3      Immature Grans, Absolute 0.13 10*3/mm3      nRBC 0.5 /100 WBC           Imaging Results (Last 24 Hours)     Procedure Component Value Units Date/Time    XR Chest 1 View [[id number]] Collected:  12/17/19 0638     Updated:  12/17/19 0717    Narrative:         PROCEDURE: Single chest view portable    REASON FOR EXAM:shortness of breath    FINDINGS: Cardiac and pulmonary vasculature are normal. Lungs are  clear. Pleural spaces are normal. No acute osseous abnormality.      Impression:       Negative single view chest    Electronically signed by:  Ferdinand Suarez MD  12/17/2019 7:16 AM CST  Workstation: [id number]          Assessment/Plan        Shortness of breath    PFO (patent foramen ovale)    Chronic anemia    Peripheral edema    Continue with the current medications, supplemental Oxygen. Will transfuse 2 units of PRBC's and repeat a CT angiogram of her lungs to rule out a pulmonary embolism.    Roberto Barron MD  12/17/19  4:35 PM

## 2019-12-17 NOTE — PLAN OF CARE
Problem: Patient Care Overview  Goal: Plan of Care Review  Outcome: Ongoing (interventions implemented as appropriate)

## 2019-12-17 NOTE — H&P
"      [facility] Medicine Admission    Date of Admission: 2019    Primary Care Physician: Provider, No Known    Chief Complaint: \"I am short of breath\"    HPI:The patient is a 29-year-old  woman who had a child by  section on 2019.  Soon after her delivery, she began to experience shortness of breath.  She also has bilateral lower extremity edema.  She decided to come to the hospital because she developed paroxysmal nocturnal dyspnea on the night that she presented at the hospital.  She has no chest pain, no diaphoresis, no nausea, no vomiting.  She felt her symptoms worsen when she attempts to get up out of bed.  She is unable to void urine without being short of breath.  The patient presented to [facility], from where she was transferred to this facility.  Her initial evaluation at the referring hospital showed normal BNP, and negative studies for pulmonary embolism.  Prior to the onset of her current symptoms, the patient has no cardiac symptoms, although a review of medical history shows that she does indeed have a history of congenital heart disease, the nature of which is not clear as of the time of this dictation.    Past Medical History:  has a past medical history of Congenital heart defect, Family history of cleft lip, Family history of congenital hydrocephalus, History of  delivery, and History of  labor.    Past Surgical History:  has a past surgical history that includes Cardiac valuve replacement (N/A, ); Cardiac surgery;  section; and  section with tubal (N/A, 2019).    Family History: family history includes ADD / ADHD in her daughter; COPD in her mother; Cancer in her mother; Heart attack in her father and maternal grandmother; Hydrocephalus in her son; Lung cancer in her maternal grandfather; No Known Problems in her daughter and sister.    Social History:  reports that " she has never smoked. She has never used smokeless tobacco. She reports that she does not drink alcohol or use drugs.    Allergies: No Known Allergies    Medications:   Prior to Admission medications    Medication Sig Start Date End Date Taking? Authorizing Provider   acetaminophen (TYLENOL) 325 MG tablet Take 325 mg by mouth Every 6 (Six) Hours As Needed for Mild Pain .   Yes Amparo Martino MD   docusate sodium 100 MG capsule Take 100 mg by mouth 2 (Two) Times a Day. 12/14/19  Yes Reji Hennessy MD   ibuprofen (ADVIL,MOTRIN) 600 MG tablet Take 1 tablet by mouth Every 8 (Eight) Hours As Needed for Mild Pain . 12/14/19  Yes Reji Hennessy MD   PRENATAL GUMMY VITAMIN Chew 1 each Daily.   Yes Amparo Martino MD   Prenatal Vit-Fe Fumarate-FA (PRENATAL VITAMIN 27-0.8) 27-0.8 MG tablet tablet Take 1 tablet by mouth Daily. 12/14/19  Yes Reji Hennessy MD   oxyCODONE-acetaminophen (PERCOCET) 7.5-325 MG per tablet Take 1 tablet by mouth Every 4 (Four) Hours As Needed for Moderate Pain  for up to 9 days. 12/14/19 12/23/19  Reji Hennessy MD     Review of Systems:  Review of Systems   Otherwise complete ROS is negative except as mentioned above.    Physical Exam:   Temp:  [98.2 °F (36.8 °C)] 98.2 °F (36.8 °C)  Heart Rate:  [] 88  Resp:  [20] 20  BP: (137)/(96) 137/96  Physical exam:  Constitutional:  Well-developed and well-nourished.  No respiratory distress.      HENT:  Head:  Normocephalic and atraumatic.  Mouth:  Moist mucous membranes.    Eyes:  Conjunctivae and EOM are normal.  Pupils are equal, round, and reactive to light.  No scleral icterus.    Neck:  Neck supple.  No JVD present.    Cardiovascular: Increased cardiac rate, regular rhythm and normal heart sounds with no murmur.  Pulmonary/Chest:  No respiratory distress, no wheezes, no crackles, with normal breath sounds and good air movement.  Abdominal:  Soft.  Bowel sounds are normal.  No distension and no tenderness.    Musculoskeletal:  No edema, no tenderness, and no deformity.  No red or swollen joints anywhere.    Neurological:  Alert and oriented to person, place, and time.  No cranial nerve deficit.  No tongue deviation.  No facial droop.  No slurred speech.   Skin:  Skin is warm and dry. No rash noted. No pallor.   Peripheral vascular: No clubbing, no cyanosis, no edema.    Results Reviewed:  I have personally reviewed current lab, radiology, and data and agree with results.  Lab Results (last 24 hours)     ** No results found for the last 24 hours. **        Imaging Results (Last 24 Hours)     ** No results found for the last 24 hours. **        Assessment:    Active Hospital Problems    Diagnosis   • Shortness of breath       Plan:  Admit the patient to telemetry at this time  Consult cardiology for further evaluation and management  Obtain proBNP or BNP  2D echo repeat  As of this time, the patient's diagnosis is not clear.  Will provide supportive care as evaluation continues.  Further changes in care will be done when more results are obtained and the diagnosis is established.  Fall precautions  Patient advised to ask for help if she needs to ambulate.  Review and reconcile home medications    I discussed the patients findings and my recommendations with the patient and the nursing staff.     Terence Agustin MD  12/17/19  6:02 AM

## 2019-12-18 VITALS
SYSTOLIC BLOOD PRESSURE: 134 MMHG | DIASTOLIC BLOOD PRESSURE: 89 MMHG | BODY MASS INDEX: 39.28 KG/M2 | OXYGEN SATURATION: 100 % | TEMPERATURE: 97.8 F | RESPIRATION RATE: 18 BRPM | HEIGHT: 66 IN | HEART RATE: 83 BPM | WEIGHT: 244.4 LBS

## 2019-12-18 PROBLEM — D62 ANEMIA DUE TO BLOOD LOSS, ACUTE: Status: ACTIVE | Noted: 2019-12-17

## 2019-12-18 LAB
ANION GAP SERPL CALCULATED.3IONS-SCNC: 11 MMOL/L (ref 5–15)
BASOPHILS # BLD AUTO: 0.05 10*3/MM3 (ref 0–0.2)
BASOPHILS NFR BLD AUTO: 0.6 % (ref 0–1.5)
BUN BLD-MCNC: 12 MG/DL (ref 6–20)
BUN/CREAT SERPL: 21.4 (ref 7–25)
CALCIUM SPEC-SCNC: 8.5 MG/DL (ref 8.6–10.5)
CHLORIDE SERPL-SCNC: 101 MMOL/L (ref 98–107)
CO2 SERPL-SCNC: 25 MMOL/L (ref 22–29)
CREAT BLD-MCNC: 0.56 MG/DL (ref 0.57–1)
DEPRECATED RDW RBC AUTO: 46.4 FL (ref 37–54)
EOSINOPHIL # BLD AUTO: 0.15 10*3/MM3 (ref 0–0.4)
EOSINOPHIL NFR BLD AUTO: 1.7 % (ref 0.3–6.2)
ERYTHROCYTE [DISTWIDTH] IN BLOOD BY AUTOMATED COUNT: 15.4 % (ref 12.3–15.4)
FERRITIN SERPL-MCNC: 194.5 NG/ML (ref 13–150)
FOLATE SERPL-MCNC: 11.6 NG/ML (ref 4.78–24.2)
GFR SERPL CREATININE-BSD FRML MDRD: 128 ML/MIN/1.73
GLUCOSE BLD-MCNC: 97 MG/DL (ref 65–99)
HCT VFR BLD AUTO: 29.2 % (ref 34–46.6)
HGB BLD-MCNC: 9.5 G/DL (ref 12–15.9)
IMM GRANULOCYTES # BLD AUTO: 0.17 10*3/MM3 (ref 0–0.05)
IMM GRANULOCYTES NFR BLD AUTO: 1.9 % (ref 0–0.5)
IRON 24H UR-MRATE: 67 MCG/DL (ref 37–145)
IRON SATN MFR SERPL: 14 % (ref 20–50)
LYMPHOCYTES # BLD AUTO: 1.44 10*3/MM3 (ref 0.7–3.1)
LYMPHOCYTES NFR BLD AUTO: 16.3 % (ref 19.6–45.3)
MCH RBC QN AUTO: 27.5 PG (ref 26.6–33)
MCHC RBC AUTO-ENTMCNC: 32.5 G/DL (ref 31.5–35.7)
MCV RBC AUTO: 84.4 FL (ref 79–97)
MONOCYTES # BLD AUTO: 0.61 10*3/MM3 (ref 0.1–0.9)
MONOCYTES NFR BLD AUTO: 6.9 % (ref 5–12)
NEUTROPHILS # BLD AUTO: 6.41 10*3/MM3 (ref 1.7–7)
NEUTROPHILS NFR BLD AUTO: 72.6 % (ref 42.7–76)
NRBC BLD AUTO-RTO: 0.8 /100 WBC (ref 0–0.2)
PLATELET # BLD AUTO: 302 10*3/MM3 (ref 140–450)
PMV BLD AUTO: 9.5 FL (ref 6–12)
POTASSIUM BLD-SCNC: 3.8 MMOL/L (ref 3.5–5.2)
RBC # BLD AUTO: 3.46 10*6/MM3 (ref 3.77–5.28)
SODIUM BLD-SCNC: 137 MMOL/L (ref 136–145)
TIBC SERPL-MCNC: 492 MCG/DL (ref 298–536)
TRANSFERRIN SERPL-MCNC: 330 MG/DL (ref 200–360)
VIT B12 BLD-MCNC: 313 PG/ML (ref 211–946)
WBC NRBC COR # BLD: 8.83 10*3/MM3 (ref 3.4–10.8)

## 2019-12-18 PROCEDURE — 82607 VITAMIN B-12: CPT | Performed by: NURSE PRACTITIONER

## 2019-12-18 PROCEDURE — 99214 OFFICE O/P EST MOD 30 MIN: CPT | Performed by: INTERNAL MEDICINE

## 2019-12-18 PROCEDURE — 83540 ASSAY OF IRON: CPT | Performed by: NURSE PRACTITIONER

## 2019-12-18 PROCEDURE — 82728 ASSAY OF FERRITIN: CPT | Performed by: NURSE PRACTITIONER

## 2019-12-18 PROCEDURE — G0378 HOSPITAL OBSERVATION PER HR: HCPCS

## 2019-12-18 PROCEDURE — 85025 COMPLETE CBC W/AUTO DIFF WBC: CPT | Performed by: INTERNAL MEDICINE

## 2019-12-18 PROCEDURE — 80048 BASIC METABOLIC PNL TOTAL CA: CPT | Performed by: INTERNAL MEDICINE

## 2019-12-18 PROCEDURE — 84466 ASSAY OF TRANSFERRIN: CPT | Performed by: NURSE PRACTITIONER

## 2019-12-18 PROCEDURE — 82746 ASSAY OF FOLIC ACID SERUM: CPT | Performed by: NURSE PRACTITIONER

## 2019-12-18 RX ADMIN — PRENATAL VIT W/ FE FUMARATE-FA TAB 27-0.8 MG 1 TABLET: 27-0.8 TAB at 08:42

## 2019-12-18 RX ADMIN — FAMOTIDINE 40 MG: 40 TABLET ORAL at 08:42

## 2019-12-18 RX ADMIN — OXYCODONE HYDROCHLORIDE AND ACETAMINOPHEN 1 TABLET: 7.5; 325 TABLET ORAL at 06:11

## 2019-12-18 RX ADMIN — OXYCODONE HYDROCHLORIDE AND ACETAMINOPHEN 1 TABLET: 7.5; 325 TABLET ORAL at 02:26

## 2019-12-18 RX ADMIN — DOCUSATE SODIUM 100 MG: 100 CAPSULE, LIQUID FILLED ORAL at 08:42

## 2019-12-18 RX ADMIN — SODIUM CHLORIDE, PRESERVATIVE FREE 10 ML: 5 INJECTION INTRAVENOUS at 08:43

## 2019-12-18 NOTE — PROGRESS NOTES
"[facility] Progress Note   LOS: 0 days   Patient Care Team:  Provider, No Known as PCP - General    Chief Complaint:  No chief complaint on file.       Subjective     Interval History:   Patient Denies: no complaints today  Patient Complaints: no complaints today  History taken from: patient    Patient reports much improvement from yesterday after receiving 1unit PRBCs and 20mg iv lasix. Bubble study w/o evidence of right to left shunt. Anemia studies wnl. H/H/ improved and hemoglobin up to 9.5.     D-dimer >4,000 however CT chest at [facility] negative, likely elevated due to post surgical/postpartum.         Objective     Vital Sign Min/Max for last 24 hours  Temp  Min: 97.2 °F (36.2 °C)  Max: 98.9 °F (37.2 °C)   BP  Min: 111/85  Max: 150/93   Pulse  Min: 72  Max: 96   Resp  Min: 18  Max: 20   SpO2  Min: 97 %  Max: 100 %   No data recorded   Weight  Min: 111 kg (244 lb 6.4 oz)  Max: 111 kg (244 lb 6.4 oz)     Flowsheet Rows      First Filed Value   Admission Height  167.6 cm (66\") Documented at 12/17/2019 0532   Admission Weight  118 kg (260 lb 2.3 oz) Documented at 12/17/2019 0208            12/17/19  0208 12/17/19  0532 12/18/19  0521   Weight: 118 kg (260 lb 2.3 oz) 112 kg (247 lb) 111 kg (244 lb 6.4 oz)       Physical Exam:  Physical Exam   Constitutional: She is oriented to person, place, and time. Vital signs are normal. She appears well-developed and well-nourished. No distress.   HENT:   Head: Normocephalic.   Right Ear: External ear normal.   Left Ear: External ear normal.   Eyes: Pupils are equal, round, and reactive to light. EOM and lids are normal.   Neck: No JVD present. Carotid bruit is not present. No tracheal deviation present. No thyromegaly present.   Cardiovascular: Normal rate, regular rhythm, normal heart sounds and intact distal pulses. Exam reveals no gallop and no friction rub.   No murmur heard.  Pulmonary/Chest: Effort normal and breath sounds normal. No stridor. No respiratory distress. " She has no wheezes. She has no rales. She exhibits no tenderness.   Abdominal: Soft. Normal appearance and bowel sounds are normal. She exhibits no distension. There is no tenderness.   Musculoskeletal: She exhibits no edema.     Vascular Status -  Her right foot exhibits normal foot vasculature  and no edema. Her left foot exhibits normal foot vasculature  and no edema.  Neurological: She is alert and oriented to person, place, and time. She has normal reflexes. She displays normal reflexes. No cranial nerve deficit.   Skin: Skin is warm and dry. Capillary refill takes 2 to 3 seconds. No rash noted. She is not diaphoretic. No erythema.   Psychiatric: She has a normal mood and affect. Her behavior is normal. Judgment and thought content normal.   Nursing note and vitals reviewed.       Results Review:     Results from last 7 days   Lab Units 12/18/19  0553 12/17/19  0734   SODIUM mmol/L 137 142   POTASSIUM mmol/L 3.8 3.6   CHLORIDE mmol/L 101 104   CO2 mmol/L 25.0 24.0   BUN mg/dL 12 8   CREATININE mg/dL 0.56* 0.61   CALCIUM mg/dL 8.5* 8.4*   BILIRUBIN mg/dL  --  0.4   ALK PHOS U/L  --  182*   ALT (SGPT) U/L  --  13   AST (SGOT) U/L  --  18   GLUCOSE mg/dL 97 88       Estimated Creatinine Clearance: 187.2 mL/min (A) (by C-G formula based on SCr of 0.56 mg/dL (L)).    Results from last 7 days   Lab Units 12/17/19  0734   MAGNESIUM mg/dL 2.0   PHOSPHORUS mg/dL 4.8*             Results from last 7 days   Lab Units 12/18/19  0553 12/17/19  0735 12/15/19  1609 12/15/19  0534 12/14/19  2354   WBC 10*3/mm3 8.83 9.86 12.06* 11.39* 14.10*   HEMOGLOBIN g/dL 9.5* 8.4* 8.6* 6.5* 6.9*   PLATELETS 10*3/mm3 302 302 213 176 191           Lab Results   Component Value Date    PROBNP 213.4 12/17/2019       I/O last 3 completed shifts:  In: 540 [P.O.:240; Blood:300]  Out: -     Cardiographics:  ECG/EMG Results (last 24 hours)     Procedure Component Value Units Date/Time    Adult Transthoracic Echo Limited W/ Cont if Necessary Per  Protocol [[id number]] Resulted:  12/17/19 1223     Updated:  12/17/19 1226     BSA 2.2 m^2      BH CV ECHO DEVANTE - RVDD 3.2 cm      IVSd 0.92 cm      LVIDd 5.2 cm      LVIDs 3.5 cm      LVPWd 0.93 cm      IVS/LVPW 0.99     FS 32.5 %      EDV(Teich) 131.2 ml      ESV(Teich) 51.9 ml      EF(Teich) 60.4 %      EDV(cubed) 143.1 ml      ESV(cubed) 44.0 ml      EF(cubed) 69.3 %      LV mass(C)d 177.2 grams      LV mass(C)dI 81.0 grams/m^2      SV(Teich) 79.3 ml      SI(Teich) 36.3 ml/m^2      SV(cubed) 99.1 ml      SI(cubed) 45.3 ml/m^2      Ao root diam 3.3 cm      Ao root area 8.6 cm^2      ACS 2.6 cm      asc Aorta Diam 3.0 cm      LVOT diam 2.3 cm      LVOT area 4.2 cm^2      LVOT area(traced) 4.2 cm^2      Ao root area (BSA corrected) 1.5     BH CV ECHO DEVANTE - BZI_BMI 39.9 kilograms/m^2      BH CV ECHO DEVANTE - BSA(HAYCOCK) 2.3 m^2      BH CV ECHO DEVANTE - BZI_METRIC_WEIGHT 112.0 kg      BH CV ECHO DEVANTE - BZI_METRIC_HEIGHT 167.6 cm      Target HR (85%) 162 bpm      Max. Pred. HR (100%) 191 bpm      Echo EF Estimated 61 %     Narrative:       · Estimated EF = 61%.  · Left ventricular systolic function is normal.  · 2D Echocardiogram Limited w/ Color. See note. The study is technically   difficult for diagnosis. The quality of the study is limited due to poor   acoustic windows related to patient positioning       Adult Transthoracic Echo Limited W/ Cont if Necessary Per Protocol [[id number]] Collected:  12/17/19 1620     Updated:  12/17/19 1644     BSA 2.2 m^2      BH CV ECHO DEVANTE - BZI_BMI 42.0 kilograms/m^2      BH CV ECHO DEVANTE - BSA(HAYCOCK) 2.4 m^2      BH CV ECHO DEVANTE - BZI_METRIC_WEIGHT 117.9 kg      BH CV ECHO DEVANTE - BZI_METRIC_HEIGHT 167.6 cm      Target HR (85%) 162 bpm      Max. Pred. HR (100%) 191 bpm     Narrative:       · Limited study performed with bubble contrast without evidence of right   to left shunt       ECG 12 Lead [[id number]] Collected:  12/17/19 0609     Updated:  12/18/19 0258    Narrative:        Test Reason : shortness of breath  Blood Pressure : **/** mmHG  Vent. Rate : 089 BPM     Atrial Rate : 089 BPM     P-R Int : 172 ms          QRS Dur : 084 ms      QT Int : 368 ms       P-R-T Axes : 033 023 021 degrees     QTc Int : 447 ms    Normal sinus rhythm with sinus arrhythmia  Possible Left atrial enlargement  Possible Anterior infarct , age undetermined  Abnormal ECG  When compared with ECG of 18-OCT-2019 08:37,  No significant change was found  Confirmed by YUNIER BOATENG MD ([id number]) on 12/18/2019 2:57:59 AM    Referred By:             Confirmed By:YUNIER BOATENG MD        Results for orders placed during the hospital encounter of 12/17/19   Adult Transthoracic Echo Limited W/ Cont if Necessary Per Protocol    Narrative · Limited study performed with bubble contrast without evidence of right   to left shunt              Xr Chest 1 View    Result Date: 12/17/2019  Negative single view chest Electronically signed by:  Ferdinand Suarez MD  12/17/2019 7:16 AM CST Workstation: [id number]      Medication Review:     Current Facility-Administered Medications:   •  acetaminophen (TYLENOL) tablet 650 mg, 650 mg, Oral, Q4H PRN **OR** [DISCONTINUED] acetaminophen (TYLENOL) 160 MG/5ML solution 650 mg, 650 mg, Oral, Q4H PRN **OR** acetaminophen (TYLENOL) suppository 650 mg, 650 mg, Rectal, Q4H PRN, Terence Agustin MD  •  docusate sodium (COLACE) capsule 100 mg, 100 mg, Oral, BID, Terence Agustin MD, 100 mg at 12/18/19 0842  •  famotidine (PEPCID) tablet 40 mg, 40 mg, Oral, Daily, Terence Agustin MD, 40 mg at 12/18/19 0842  •  heparin (porcine) 5000 UNIT/ML injection 5,000 Units, 5,000 Units, Subcutaneous, Q12H, Terence Agustin MD  •  ibuprofen (ADVIL,MOTRIN) tablet 600 mg, 600 mg, Oral, Q8H PRN, Terence Agustin MD  •  morphine injection 1 mg, 1 mg, Intravenous, Q4H PRN **AND** naloxone (NARCAN) injection 0.4 mg, 0.4 mg, Intravenous, Q5 Min PRN, Terence Agustin MD  •  ondansetron (ZOFRAN) tablet 4 mg, 4 mg, Oral,  Q6H PRN **OR** ondansetron (ZOFRAN) injection 4 mg, 4 mg, Intravenous, Q6H PRN, Terence Agustin MD  •  oxyCODONE-acetaminophen (PERCOCET) 7.5-325 MG per tablet 1 tablet, 1 tablet, Oral, Q4H PRN, Terence Agustin MD, 1 tablet at 12/18/19 0611  •  prenatal vitamin 27-0.8 tablet 1 tablet, 1 tablet, Oral, Daily, Terence Agustin MD, 1 tablet at 12/18/19 0842  •  sodium chloride 0.9 % flush 10 mL, 10 mL, Intravenous, Q12H, Terence Agustin MD, 10 mL at 12/18/19 0843  •  sodium chloride 0.9 % flush 10 mL, 10 mL, Intravenous, PRN, Terence Agustin MD    Assessment/Plan       Shortness of breath    Chronic anemia    PFO (patent foramen ovale)    Peripheral edema      1. Shortness of breath- improved  -Believe this is s/t anemia as described below.   -Bubble w/o evidence of right to left shunt.   -[facility] at [facility]-negative for PE, proBNP negative. EKG and troponin negative. D-dimer >4000 but likely elevated due to postpartum and postsurgical. CXR negative for pulmonary edema. Euvolemic.   -TTE EF 61%, no LVH, no DD.      2. Acute/chronic anemia  --Received 1 unit PRBCs yesterday . Repeat H/H better this AM.  -Anemia studies: iron profile wnl. B12/folate pending. Recommend patient to follow up with PCP outpatient      2. PFO  -Bubble study w/o evidence of right to left shunt.    3. Peripheral edema  -Patient is c/o lower leg/pedal swelling. However this is likely r/t pregnancy as the edema is trace. 20mg of IV lasix given yesterday with unit of blood.      I discussed the patients findings and my recommendations with patient, nursing staff and consulting provider and Dr. John.      Disposition: Plan for discharge home today. Recommendation follow up with PCP regarding anemia. No cardiology indication for follow up outpatient.          Plan for disposition:Where: home and When:  today              This document has been electronically signed by CHONG Fiore on December 18, 2019 10:08 AM

## 2019-12-18 NOTE — DISCHARGE SUMMARY
Discharge Summary  Roberto Barron MD  Hospitalist     Date of Discharge:  12/18/2019    Discharge Diagnosis:      Shortness of breath    Anemia due to blood loss, acute    Peripheral edema    ASD (atrial septal defect)      Presenting Problem/History of Present Illness  Dyspnea     Hospital Course  Patient is a 29 y.o. 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 She improved with the help of diuresis, fluid restriction and transfusion of one unit PRBC. Her vital signs are stable, her Oxygen saturation on room air is normal and she will be discharged home.    The pulmonary angiogram obtained at [facility] did not show any pulmonary embolism. Her LV EF is within normal limit.        Consults:   Consults     Date and Time Order Name Status Description    12/17/2019 0831 Inpatient Cardiology Consult Completed         Condition on Discharge:  stable    Vital Signs  Temp:  [97.2 °F (36.2 °C)-98.9 °F (37.2 °C)] 97.8 °F (36.6 °C)  Heart Rate:  [72-96] 83  Resp:  [18-20] 18  BP: (111-150)/(72-97) 134/89    Physical Exam:  Physical Exam   Constitutional: She is oriented to person, place, and time. She appears well-developed and well-nourished. No distress.   HENT:   Head: Normocephalic and atraumatic.   Eyes: Pupils are equal, round, and reactive to light. EOM are normal. No scleral icterus.   Neck: Normal range of motion. Neck supple.   Cardiovascular: Normal rate and regular rhythm.   Pulmonary/Chest: Effort normal and breath sounds normal. No stridor. No respiratory distress. She has no wheezes.   Abdominal: Soft. Bowel sounds are normal. She exhibits no distension. There is no tenderness. There is no guarding.   Musculoskeletal: Normal range of motion. She exhibits edema (1+). She exhibits no tenderness.   Neurological: She is alert and oriented to person, place, and time. She displays  normal reflexes. No cranial nerve deficit. Coordination normal.   Skin: Skin is warm and dry. No rash noted. No erythema. There is pallor.   Psychiatric: She has a normal mood and affect. Her behavior is normal.   Vitals reviewed.      Discharge Disposition  Home or Self Care    Discharge Medications     Discharge Medications      Continue These Medications      Instructions Start Date   acetaminophen 325 MG tablet  Commonly known as:  TYLENOL   325 mg, Oral, Every 6 Hours PRN      docusate sodium 100 MG capsule   100 mg, Oral, 2 Times Daily      ibuprofen 600 MG tablet  Commonly known as:  ADVIL,MOTRIN   600 mg, Oral, Every 8 Hours PRN      oxyCODONE-acetaminophen 7.5-325 MG per tablet  Commonly known as:  PERCOCET   1 tablet, Oral, Every 4 Hours PRN      PRENATAL GUMMY VITAMIN   1 each, Oral, Daily      prenatal vitamin 27-0.8 27-0.8 MG tablet tablet   1 tablet, Oral, Daily             Discharge Diet:   Diet Instructions     Diet: Regular      Discharge Diet:  Regular    Fluid Restriction per day:  1500 mL Fluid          Activity at Discharge:   Activity Instructions     Activity as Tolerated            Follow-up Appointments  Future Appointments   Date Time Provider Department Center   12/26/2019  2:15 PM Nikky Solomon MD [facility] None   1/3/2020 10:45 AM Aashish Wren DO [facility] None     Additional Instructions for the Follow-ups that You Need to Schedule     Discharge Follow-up with PCP   As directed       Currently Documented PCP:    Provider, No Known    PCP Phone Number:    None     Follow Up Details:  in 1 week                Roberto Barron MD  12/18/19  1:41 PM

## 2019-12-18 NOTE — PLAN OF CARE
Problem: Patient Care Overview  Goal: Plan of Care Review  Outcome: Ongoing (interventions implemented as appropriate)  Flowsheets (Taken 12/18/2019 0544)  Progress: no change  Plan of Care Reviewed With: patient  Outcome Summary: Pt. received 1 unit of blood this shift. C/O SOA at times. Pt. extremely anxious, asked this nurse if something was put in her IV while she was sleeping. VSS, will continue to monitor.  Goal: Individualization and Mutuality  Outcome: Ongoing (interventions implemented as appropriate)  Goal: Discharge Needs Assessment  Outcome: Ongoing (interventions implemented as appropriate)  Goal: Interprofessional Rounds/Family Conf  Outcome: Ongoing (interventions implemented as appropriate)     Problem: Breathing Pattern Ineffective (Adult)  Goal: Effective Oxygenation/Ventilation  Description  Patient will demonstrate the desired outcomes by discharge/transition of care.  Outcome: Ongoing (interventions implemented as appropriate)  Flowsheets (Taken 12/18/2019 0544)  Effective Oxygenation/Ventilation: making progress toward outcome  Goal: Anxiety/Fear Reduction  Description  Patient will demonstrate the desired outcomes by discharge/transition of care.  Outcome: Ongoing (interventions implemented as appropriate)  Flowsheets (Taken 12/18/2019 0544)  Anxiety/Fear Reduction: making progress toward outcome

## 2019-12-19 DIAGNOSIS — O43.193: Primary | ICD-10-CM

## 2019-12-19 DIAGNOSIS — Z3A.34 34 WEEKS GESTATION OF PREGNANCY: ICD-10-CM

## 2019-12-19 DIAGNOSIS — Z82.79 FAMILY HISTORY OF CONGENITAL ANOMALIES: ICD-10-CM

## 2019-12-19 LAB
ABO + RH BLD: NORMAL
BH BB BLOOD EXPIRATION DATE: NORMAL
BH BB BLOOD TYPE BARCODE: 6200
BH BB DISPENSE STATUS: NORMAL
BH BB PRODUCT CODE: NORMAL
BH BB UNIT NUMBER: NORMAL
UNIT  ABO: NORMAL
UNIT  RH: NORMAL

## 2019-12-20 DIAGNOSIS — Z82.79 FAMILY HISTORY OF CONGENITAL ANOMALIES: ICD-10-CM

## 2019-12-20 DIAGNOSIS — O43.193: ICD-10-CM

## 2019-12-20 DIAGNOSIS — Z3A.34 34 WEEKS GESTATION OF PREGNANCY: ICD-10-CM

## 2019-12-26 ENCOUNTER — OFFICE VISIT (OUTPATIENT)
Dept: FAMILY MEDICINE CLINIC | Facility: CLINIC | Age: 29
End: 2019-12-26

## 2019-12-26 VITALS
OXYGEN SATURATION: 99 % | WEIGHT: 224 LBS | SYSTOLIC BLOOD PRESSURE: 120 MMHG | HEART RATE: 80 BPM | HEIGHT: 66 IN | DIASTOLIC BLOOD PRESSURE: 74 MMHG | BODY MASS INDEX: 36 KG/M2

## 2019-12-26 DIAGNOSIS — Z09 HOSPITAL DISCHARGE FOLLOW-UP: Primary | ICD-10-CM

## 2019-12-26 DIAGNOSIS — D64.9 ANEMIA, UNSPECIFIED TYPE: ICD-10-CM

## 2019-12-26 PROCEDURE — 99213 OFFICE O/P EST LOW 20 MIN: CPT | Performed by: STUDENT IN AN ORGANIZED HEALTH CARE EDUCATION/TRAINING PROGRAM

## 2019-12-26 NOTE — PROGRESS NOTES
Subjective   Emily Dean is a 29 y.o. female who presents for hospital follow-up of shortness of breath.     History of Present Illness     The patient was admitted to [facility] from 2019 to 2019 for shortness of breath.  The patient apparently has a history of congenital heart abnormalities including surgical correction of a PDA and small ASD/VSD after birth.  Patient did undergo complicated  section delivery due to adhesive disease on 2019. The patient reports that she did receive 2 blood transfusions while hospitalized for her delivery, which improved her symptoms. Following delivery, she again developed shortness of air and lateral lower extremity pneumonia and presented to [facility], after which she was transferred to [facility].  Patient's work-up was negative for pulmonary embolism as well as a normal BNP.  In the hospital, she was diuresed and fluid restricted.  She also received a transfusion of 1 unit of pRBCs. Review of the chart shows that the patient had a normal echocardiogram on 19. She was evaluated by Cardiology (Dr. John), and she was cleared from his standpoint. Of note, the patient was followed by Dr. John through his pregnancy.    The following portions of the patient's history were reviewed and updated as appropriate: allergies, current medications, past social history and problem list.    Review of Systems   Constitutional: Negative for activity change, appetite change, chills and fever.   HENT: Negative for rhinorrhea, sneezing and sore throat.    Eyes: Negative for discharge and visual disturbance.   Respiratory: Positive for shortness of breath ( intermittently). Negative for cough and wheezing.    Cardiovascular: Negative for chest pain, palpitations and leg swelling.   Gastrointestinal: Negative for abdominal pain, constipation, diarrhea, nausea and vomiting.   Genitourinary: Negative for  "difficulty urinating and dysuria.   Musculoskeletal: Negative for joint swelling and myalgias.   Skin: Negative for rash and wound.   Neurological: Negative for dizziness and syncope.   Psychiatric/Behavioral: Negative for confusion and sleep disturbance.     Objective   /74   Pulse 80   Ht 167.6 cm (66\")   Wt 102 kg (224 lb)   LMP 04/02/2019   SpO2 99%   BMI 36.15 kg/m²     Physical Exam   Constitutional: She is oriented to person, place, and time. She appears well-developed and well-nourished. No distress.   HENT:   Head: Normocephalic and atraumatic.   Nose: Nose normal.   Eyes: Conjunctivae and EOM are normal. Right eye exhibits no discharge. Left eye exhibits no discharge. No scleral icterus.   Neck: Normal range of motion. Neck supple.   Cardiovascular: Normal rate, regular rhythm and normal heart sounds.   No murmur heard.  Pulmonary/Chest: Effort normal and breath sounds normal. No respiratory distress. She has no wheezes. She has no rales.   Abdominal: Soft. Bowel sounds are normal. She exhibits no distension. There is no tenderness.   Musculoskeletal: She exhibits no edema.   Neurological: She is alert and oriented to person, place, and time.   Skin: Skin is warm. No rash noted. She is not diaphoretic. No erythema.   Psychiatric: She has a normal mood and affect. Her behavior is normal.   Vitals reviewed.    Assessment/Plan   Emily was seen today for shortness of breath.    Diagnoses and all orders for this visit:    Hospital discharge follow-up        -     The patient is generally stable from a hospital discharge and point.  She does continue to endorse mild shortness of breath, which is likely related to her anemia.  Will obtain repeat CBC today.  Will continue to follow-up with patient symptoms.    Anemia, unspecified type  -     CBC & Differential      Return in about 4 weeks (around 1/23/2020) for Annual.    Preventative:  Health Maintenance   Topic Date Due   • ANNUAL PHYSICAL  05/12/1993 "   • PAP SMEAR  05/30/2022   • TDAP/TD VACCINES (2 - Td) 10/21/2029   • INFLUENZA VACCINE  Completed       Patient's Body mass index is 36.15 kg/m². BMI is above normal parameters. Recommendations include: exercise counseling and nutrition counseling.      RISK SCORE: 3      Nikky Solomon M.D. PGY3  [facility]  [address] [place] [address]  Office: [phone]      This document has been electronically signed by Nikky Solomon MD on December 26, 2019 3:27 PM

## 2019-12-27 NOTE — PROGRESS NOTES
I have seen the patient.  I have reviewed the notes, assessments, and/or procedures performed by Nikky Solomon MD, I concur with her/his documentation and assessment and plan for Emily Dean.               This document has been electronically signed by Taiwo Flores MD on December 27, 2019 10:45 AM

## 2020-01-03 PROBLEM — O34.211 ENCOUNTER FOR MATERNAL CARE FOR LOW TRANSVERSE SCAR FROM PREVIOUS CESAREAN DELIVERY: Status: RESOLVED | Noted: 2019-08-26 | Resolved: 2020-01-03

## 2020-01-03 PROBLEM — Z82.79 FAMILY HISTORY OF CONGENITAL HYDROCEPHALUS: Status: RESOLVED | Noted: 2019-08-26 | Resolved: 2020-01-03

## 2020-01-03 PROBLEM — I83.813 VARICOSE VEINS OF BOTH LOWER EXTREMITIES WITH PAIN: Status: RESOLVED | Noted: 2019-09-16 | Resolved: 2020-01-03

## 2020-01-03 PROBLEM — O99.891 MATERNAL CONGENITAL CARDIAC ANOMALY AFFECTING PREGNANCY, ANTEPARTUM, SECOND TRIMESTER: Status: RESOLVED | Noted: 2019-08-26 | Resolved: 2020-01-03

## 2020-01-03 PROBLEM — R00.2 HEART PALPITATIONS: Status: RESOLVED | Noted: 2019-09-16 | Resolved: 2020-01-03

## 2020-01-03 PROBLEM — O43.92 DISORDER OF PLACENTA IN SECOND TRIMESTER: Status: RESOLVED | Noted: 2019-08-26 | Resolved: 2020-01-03

## 2020-01-03 PROBLEM — Q24.9 MATERNAL CONGENITAL CARDIAC ANOMALY AFFECTING PREGNANCY, ANTEPARTUM, SECOND TRIMESTER: Status: RESOLVED | Noted: 2019-08-26 | Resolved: 2020-01-03

## 2020-01-07 ENCOUNTER — OFFICE VISIT (OUTPATIENT)
Dept: OBSTETRICS AND GYNECOLOGY | Facility: CLINIC | Age: 30
End: 2020-01-07

## 2020-01-07 VITALS
HEIGHT: 66 IN | SYSTOLIC BLOOD PRESSURE: 123 MMHG | BODY MASS INDEX: 38.57 KG/M2 | WEIGHT: 240 LBS | DIASTOLIC BLOOD PRESSURE: 75 MMHG

## 2020-01-07 DIAGNOSIS — Z09 POSTOPERATIVE FOLLOW-UP: ICD-10-CM

## 2020-01-07 PROCEDURE — 99213 OFFICE O/P EST LOW 20 MIN: CPT | Performed by: OBSTETRICS & GYNECOLOGY

## 2020-01-07 NOTE — PROGRESS NOTES
"Chief complaint: Postpartum visit    SUBJECTIVE:   Pt is a 29 y.o.  now s/p repeat low transverse  section with bilateral tubal ligation 3 weeks ago.. Pt denies fever, abdominal pain, n/v/d/c, VB, Pt currently breast feeding and taking PNV, ambulating without difficulty, urinating and stooling without complaints and tolerating normal diet.  Patient has not had intercourse, denies any depression at this time.      OBJECTIVE:  /75   Ht 167.6 cm (66\")   Wt 109 kg (240 lb)   BMI 38.74 kg/m²     Review of Systems   Constitutional: Negative for chills, fatigue and fever.   HENT: Negative for sore throat.    Eyes: Negative for visual disturbance.   Respiratory: Negative for cough, shortness of breath and wheezing.    Cardiovascular: Negative for chest pain, palpitations and leg swelling.   Gastrointestinal: Negative for abdominal pain, diarrhea, nausea and vomiting.   Genitourinary: Negative for dysuria, flank pain, frequency, vaginal bleeding, vaginal discharge and vaginal pain.   Neurological: Negative for syncope, light-headedness and headaches.   Psychiatric/Behavioral: Negative for dysphoric mood and suicidal ideas. The patient is not nervous/anxious.        Physical Exam   Constitutional: She is oriented to person, place, and time. She appears well-developed and well-nourished. No distress.   HENT:   Head: Normocephalic.   Abdominal: Soft. She exhibits no distension. There is no tenderness. There is no guarding.   Appropriately healing Pfannenstiel incision.  Glue over incision to be taken off today.   Musculoskeletal: Normal range of motion.   Neurological: She is alert and oriented to person, place, and time.   Skin: Skin is warm and dry. She is not diaphoretic.   Psychiatric: She has a normal mood and affect. Her behavior is normal. Judgment and thought content normal.   Vitals reviewed.      ASSESSMENT:    Pt is a 29 y.o.  s/p repeat low transverse  section, doing well and " healing appropriately at this time.  Inspection of back did not reveal any lesions this patient did have some complaints of pain at the epidural site.  PLAN:   1.  We will for contraception  2. Pt to continue to increase exercise/daily activities as tolerated   3. Continue prenatal vitamins   4. f/u 3 weeks  5.  Letter given that patient should not return to work until 6 weeks after delivery.    Med reconciliation completed.        This document has been electronically signed by Aashish Wren DO on January 7, 2020 3:14 PM

## 2020-02-03 ENCOUNTER — TELEPHONE (OUTPATIENT)
Dept: OBSTETRICS AND GYNECOLOGY | Facility: CLINIC | Age: 30
End: 2020-02-03

## 2020-10-01 ENCOUNTER — LAB (OUTPATIENT)
Dept: LAB | Facility: HOSPITAL | Age: 30
End: 2020-10-01

## 2020-10-01 ENCOUNTER — OFFICE VISIT (OUTPATIENT)
Dept: FAMILY MEDICINE CLINIC | Facility: CLINIC | Age: 30
End: 2020-10-01

## 2020-10-01 VITALS
HEIGHT: 66 IN | BODY MASS INDEX: 42.97 KG/M2 | HEART RATE: 94 BPM | OXYGEN SATURATION: 98 % | SYSTOLIC BLOOD PRESSURE: 118 MMHG | DIASTOLIC BLOOD PRESSURE: 80 MMHG | WEIGHT: 267.4 LBS

## 2020-10-01 DIAGNOSIS — E03.9 HYPOTHYROIDISM, UNSPECIFIED TYPE: ICD-10-CM

## 2020-10-01 DIAGNOSIS — R60.0 LOCALIZED EDEMA: ICD-10-CM

## 2020-10-01 DIAGNOSIS — F41.9 ANXIETY: Primary | ICD-10-CM

## 2020-10-01 DIAGNOSIS — R63.5 WEIGHT GAIN: ICD-10-CM

## 2020-10-01 LAB
BILIRUB UR QL STRIP: ABNORMAL
CLARITY UR: CLEAR
COLOR UR: YELLOW
GLUCOSE UR STRIP-MCNC: NEGATIVE MG/DL
HGB UR QL STRIP.AUTO: NEGATIVE
KETONES UR QL STRIP: ABNORMAL
LEUKOCYTE ESTERASE UR QL STRIP.AUTO: ABNORMAL
NITRITE UR QL STRIP: NEGATIVE
PH UR STRIP.AUTO: 6.5 [PH] (ref 5–8)
PROT UR QL STRIP: ABNORMAL
SP GR UR STRIP: 1.02 (ref 1–1.03)
UROBILINOGEN UR QL STRIP: ABNORMAL

## 2020-10-01 PROCEDURE — 81003 URINALYSIS AUTO W/O SCOPE: CPT | Performed by: STUDENT IN AN ORGANIZED HEALTH CARE EDUCATION/TRAINING PROGRAM

## 2020-10-01 PROCEDURE — 80061 LIPID PANEL: CPT | Performed by: STUDENT IN AN ORGANIZED HEALTH CARE EDUCATION/TRAINING PROGRAM

## 2020-10-01 PROCEDURE — 84443 ASSAY THYROID STIM HORMONE: CPT | Performed by: STUDENT IN AN ORGANIZED HEALTH CARE EDUCATION/TRAINING PROGRAM

## 2020-10-01 PROCEDURE — 84439 ASSAY OF FREE THYROXINE: CPT | Performed by: STUDENT IN AN ORGANIZED HEALTH CARE EDUCATION/TRAINING PROGRAM

## 2020-10-01 PROCEDURE — 81015 MICROSCOPIC EXAM OF URINE: CPT | Performed by: STUDENT IN AN ORGANIZED HEALTH CARE EDUCATION/TRAINING PROGRAM

## 2020-10-01 PROCEDURE — 99213 OFFICE O/P EST LOW 20 MIN: CPT | Performed by: STUDENT IN AN ORGANIZED HEALTH CARE EDUCATION/TRAINING PROGRAM

## 2020-10-01 PROCEDURE — 85025 COMPLETE CBC W/AUTO DIFF WBC: CPT | Performed by: STUDENT IN AN ORGANIZED HEALTH CARE EDUCATION/TRAINING PROGRAM

## 2020-10-01 PROCEDURE — 80053 COMPREHEN METABOLIC PANEL: CPT | Performed by: STUDENT IN AN ORGANIZED HEALTH CARE EDUCATION/TRAINING PROGRAM

## 2020-10-01 RX ORDER — LEVOTHYROXINE SODIUM 0.05 MG/1
TABLET ORAL
COMMUNITY
Start: 2020-08-31 | End: 2021-04-30 | Stop reason: SDUPTHER

## 2020-10-01 NOTE — PROGRESS NOTES
Subjective:  Emily Dean is a 30 y.o. female who presents for establish care, weight gain.    Was seeing a doctor at [place] but lives in [place] so would like to establish care here. Was recently diagnosed with hypothyroidism in August; started on levothyroxine 75 mcg for 4 days, could not tolerate, reduced to 50mcg tolerating better but still having adverse effects; primarily headaches, and increased anxiety.    Additionally, patient complains of unintentional weight gain; reports regular exercise, healthy diet, good portion control.  States that she is gained as much is 12 pounds in the last 4 days, does have some bilateral leg swelling; denied chest pain, numbness, tingling, orthopnea,shortness  of breath, fever, chills, night sweats, constipation.  Never had an issue with weight until pregnancies.  Did have a complication on the last  section, required multiple blood transfusions; did have tubes tied.          Patient Active Problem List   Diagnosis   • Maternal obesity syndrome in third trimester   • Vaginal bleeding in pregnancy, second trimester   • Pain of round ligament affecting pregnancy, antepartum   •  labor in third trimester   •  labor third trimester with  delivery third trimester   • Shortness of breath   • Anemia due to blood loss, acute   • ASD (atrial septal defect)   • PFO (patent foramen ovale)   • Peripheral edema       Current Outpatient Medications:   •  levothyroxine (SYNTHROID, LEVOTHROID) 50 MCG tablet, , Disp: , Rfl:   •  acetaminophen (TYLENOL) 325 MG tablet, Take 325 mg by mouth Every 6 (Six) Hours As Needed for Mild Pain ., Disp: , Rfl:   •  ibuprofen (ADVIL,MOTRIN) 600 MG tablet, Take 1 tablet by mouth Every 8 (Eight) Hours As Needed for Mild Pain ., Disp: 60 tablet, Rfl: 3  •  Prenatal Vit-Fe Fumarate-FA (PRENATAL VITAMIN 27-0.8) 27-0.8 MG tablet tablet, Take 1 tablet by mouth Daily., Disp: 90 tablet, Rfl: 3    Review of Systems  Review  of Systems   Constitutional: Positive for unexpected weight change. Negative for appetite change, diaphoresis and fever.   HENT: Negative for sore throat.    Eyes: Negative for discharge and visual disturbance.   Respiratory: Negative for cough and shortness of breath.    Cardiovascular: Negative for chest pain, palpitations and leg swelling.   Gastrointestinal: Negative for abdominal pain, diarrhea and vomiting.   Genitourinary: Negative for dysuria.   Skin: Negative for rash.   Neurological: Negative for light-headedness and headaches.   Psychiatric/Behavioral: Negative for agitation.       Patient Active Problem List   Diagnosis   • Maternal obesity syndrome in third trimester   • Vaginal bleeding in pregnancy, second trimester   • Pain of round ligament affecting pregnancy, antepartum   •  labor in third trimester   •  labor third trimester with  delivery third trimester   • Shortness of breath   • Anemia due to blood loss, acute   • ASD (atrial septal defect)   • PFO (patent foramen ovale)   • Peripheral edema     Past Surgical History:   Procedure Laterality Date   • CARDIAC SURGERY     • CARDIAC VALVE SURGERY N/A     3 open vaulves at birth   •  SECTION      3 prior csections   •  SECTION WITH TUBAL N/A 2019    Procedure:  SECTION REPEAT WITH TUBAL;  Surgeon: Aashish Wren DO;  Location: [facility] LABOR DELIVERY;  Service: Obstetrics     Social History     Socioeconomic History   • Marital status: Single     Spouse name: Not on file   • Number of children: Not on file   • Years of education: Not on file   • Highest education level: Not on file   Tobacco Use   • Smoking status: Never Smoker   • Smokeless tobacco: Never Used   Substance and Sexual Activity   • Alcohol use: No     Frequency: Never   • Drug use: No   • Sexual activity: Yes     Partners: Male     Comment: last pap smear 2019 negative at  [facility]     Family History   Problem Relation Age  of Onset   • COPD Mother    • Cancer Mother         lung   • Heart attack Father    • No Known Problems Sister    • Hydrocephalus Son    • ADD / ADHD Daughter    • Heart attack Maternal Grandmother    • Lung cancer Maternal Grandfather    • No Known Problems Daughter      No visits with results within 6 Month(s) from this visit.   Latest known visit with results is:   Admission on 12/17/2019, Discharged on 12/18/2019   Component Date Value Ref Range Status   • proBNP 12/17/2019 213.4  5.0 - 450.0 pg/mL Final   • WBC 12/17/2019 9.86  3.40 - 10.80 10*3/mm3 Final   • RBC 12/17/2019 3.07* 3.77 - 5.28 10*6/mm3 Final   • Hemoglobin 12/17/2019 8.4* 12.0 - 15.9 g/dL Final   • Hematocrit 12/17/2019 26.1* 34.0 - 46.6 % Final   • MCV 12/17/2019 85.0  79.0 - 97.0 fL Final   • MCH 12/17/2019 27.4  26.6 - 33.0 pg Final   • MCHC 12/17/2019 32.2  31.5 - 35.7 g/dL Final   • RDW 12/17/2019 15.2  12.3 - 15.4 % Final   • RDW-SD 12/17/2019 46.0  37.0 - 54.0 fl Final   • MPV 12/17/2019 9.9  6.0 - 12.0 fL Final   • Platelets 12/17/2019 302  140 - 450 10*3/mm3 Final   • Neutrophil % 12/17/2019 76.5* 42.7 - 76.0 % Final   • Lymphocyte % 12/17/2019 13.6* 19.6 - 45.3 % Final   • Monocyte % 12/17/2019 6.7  5.0 - 12.0 % Final   • Eosinophil % 12/17/2019 1.6  0.3 - 6.2 % Final   • Basophil % 12/17/2019 0.3  0.0 - 1.5 % Final   • Immature Grans % 12/17/2019 1.3* 0.0 - 0.5 % Final   • Neutrophils, Absolute 12/17/2019 7.54* 1.70 - 7.00 10*3/mm3 Final   • Lymphocytes, Absolute 12/17/2019 1.34  0.70 - 3.10 10*3/mm3 Final   • Monocytes, Absolute 12/17/2019 0.66  0.10 - 0.90 10*3/mm3 Final   • Eosinophils, Absolute 12/17/2019 0.16  0.00 - 0.40 10*3/mm3 Final   • Basophils, Absolute 12/17/2019 0.03  0.00 - 0.20 10*3/mm3 Final   • Immature Grans, Absolute 12/17/2019 0.13* 0.00 - 0.05 10*3/mm3 Final   • nRBC 12/17/2019 0.5* 0.0 - 0.2 /100 WBC Final   • Glucose 12/17/2019 88  65 - 99 mg/dL Final   • BUN 12/17/2019 8  6 - 20 mg/dL Final   • Creatinine  12/17/2019 0.61  0.57 - 1.00 mg/dL Final   • Sodium 12/17/2019 142  136 - 145 mmol/L Final   • Potassium 12/17/2019 3.6  3.5 - 5.2 mmol/L Final   • Chloride 12/17/2019 104  98 - 107 mmol/L Final   • CO2 12/17/2019 24.0  22.0 - 29.0 mmol/L Final   • Calcium 12/17/2019 8.4* 8.6 - 10.5 mg/dL Final   • Total Protein 12/17/2019 6.0  6.0 - 8.5 g/dL Final   • Albumin 12/17/2019 2.60* 3.50 - 5.20 g/dL Final   • ALT (SGPT) 12/17/2019 13  1 - 33 U/L Final   • AST (SGOT) 12/17/2019 18  1 - 32 U/L Final   • Alkaline Phosphatase 12/17/2019 182* 39 - 117 U/L Final   • Total Bilirubin 12/17/2019 0.4  0.2 - 1.2 mg/dL Final   • eGFR Non  Amer 12/17/2019 116  >60 mL/min/1.73 Final   • Globulin 12/17/2019 3.4  gm/dL Final   • A/G Ratio 12/17/2019 0.8  g/dL Final   • BUN/Creatinine Ratio 12/17/2019 13.1  7.0 - 25.0 Final   • Anion Gap 12/17/2019 14.0  5.0 - 15.0 mmol/L Final   • Total Cholesterol 12/17/2019 229* 0 - 200 mg/dL Final   • Triglycerides 12/17/2019 151* 0 - 150 mg/dL Final   • HDL Cholesterol 12/17/2019 51  40 - 60 mg/dL Final   • LDL Cholesterol  12/17/2019 148* 0 - 100 mg/dL Final   • VLDL Cholesterol 12/17/2019 30.2  mg/dL Final   • LDL/HDL Ratio 12/17/2019 2.90   Final   • Magnesium 12/17/2019 2.0  1.6 - 2.6 mg/dL Final   • Phosphorus 12/17/2019 4.8* 2.5 - 4.5 mg/dL Final   • TSH 12/17/2019 3.000  0.270 - 4.200 uIU/mL Final   • Troponin T 12/17/2019 <0.010  0.000 - 0.030 ng/mL Final   • Troponin T 12/17/2019 <0.010  0.000 - 0.030 ng/mL Final   • TSH 12/17/2019 2.930  0.270 - 4.200 uIU/mL Final   • BSA 12/17/2019 2.2  m^2 Final   • RVIDd 12/17/2019 3.2  cm Final   • IVSd 12/17/2019 0.92  cm Final   • LVIDd 12/17/2019 5.2  cm Final   • LVIDs 12/17/2019 3.5  cm Final   • LVPWd 12/17/2019 0.93  cm Final   • IVS/LVPW 12/17/2019 0.99   Final   • FS 12/17/2019 32.5  % Final   • EDV(Teich) 12/17/2019 131.2  ml Final   • ESV(Teich) 12/17/2019 51.9  ml Final   • EF(Teich) 12/17/2019 60.4  % Final   • EDV(cubed) 12/17/2019  143.1  ml Final   • ESV(cubed) 12/17/2019 44.0  ml Final   • EF(cubed) 12/17/2019 69.3  % Final   • LV mass(C)d 12/17/2019 177.2  grams Final   • LV mass(C)dI 12/17/2019 81.0  grams/m^2 Final   • SV(Teich) 12/17/2019 79.3  ml Final   • SI(Teich) 12/17/2019 36.3  ml/m^2 Final   • SV(cubed) 12/17/2019 99.1  ml Final   • SI(cubed) 12/17/2019 45.3  ml/m^2 Final   • Ao root diam 12/17/2019 3.3  cm Final   • Ao root area 12/17/2019 8.6  cm^2 Final   • ACS 12/17/2019 2.6  cm Final   • asc Aorta Diam 12/17/2019 3.0  cm Final   • LVOT diam 12/17/2019 2.3  cm Final   • LVOT area 12/17/2019 4.2  cm^2 Final   • LVOT area(traced) 12/17/2019 4.2  cm^2 Final   • Ao root area (BSA corrected) 12/17/2019 1.5   Final   • BH CV ECHO DEVANTE - BZI_BMI 12/17/2019 39.9  kilograms/m^2 Final   • BH CV ECHO DEVANTE - BSA(HAYCOCK) 12/17/2019 2.3  m^2 Final   • BH CV ECHO DEVANTE - BZI_METRIC_WEIGHT 12/17/2019 112.0  kg Final   • BH CV ECHO DEVANTE - BZI_METRIC_HEIGHT 12/17/2019 167.6  cm Final   • Target HR (85%) 12/17/2019 162  bpm Final   • Max. Pred. HR (100%) 12/17/2019 191  bpm Final   • Echo EF Estimated 12/17/2019 61  % Final   • D-Dimer, Quantitative 12/17/2019 >4,000* 0 - 470 ng/mL (FEU) Final   • BSA 12/17/2019 2.2  m^2 Final   • BH CV ECHO DEVANTE - BZI_BMI 12/17/2019 42.0  kilograms/m^2 Final   • BH CV ECHO DEVANTE - BSA(HAYCOCK) 12/17/2019 2.4  m^2 Final   • BH CV ECHO DEVANTE - BZI_METRIC_WEIGHT 12/17/2019 117.9  kg Final   • BH CV ECHO DEVANTE - BZI_METRIC_HEIGHT 12/17/2019 167.6  cm Final   • Target HR (85%) 12/17/2019 162  bpm Final   • Max. Pred. HR (100%) 12/17/2019 191  bpm Final   • Product Code 12/19/2019 L2672X22   Final   • Unit Number 12/19/2019 [id number]   Final   • UNIT  ABO 12/19/2019 A   Final   • UNIT  RH 12/19/2019 POS   Final   • Dispense Status 12/19/2019 PT   Final   • Blood Type 12/19/2019 APOS   Final   • Blood Expiration Date 12/19/2019 891507303150   Final   • Blood Type Barcode 12/19/2019 6200   Final   • ABO Type  12/17/2019 A   Final   • RH type 12/17/2019 Positive   Final   • Antibody Screen 12/17/2019 Negative   Final   • T&S Expiration Date 12/17/2019 12/20/2019 11:59:59 PM   Final   • Previous History 12/17/2019 Previous Record on File   Final   • Iron 12/18/2019 67  37 - 145 mcg/dL Final   • Iron Saturation 12/18/2019 14* 20 - 50 % Final   • Transferrin 12/18/2019 330  200 - 360 mg/dL Final   • TIBC 12/18/2019 492  298 - 536 mcg/dL Final   • Ferritin 12/18/2019 194.50* 13.00 - 150.00 ng/mL Final   • Vitamin B-12 12/18/2019 313  211 - 946 pg/mL Final   • Folate 12/18/2019 11.60  4.78 - 24.20 ng/mL Final   • Glucose 12/18/2019 97  65 - 99 mg/dL Final   • BUN 12/18/2019 12  6 - 20 mg/dL Final   • Creatinine 12/18/2019 0.56* 0.57 - 1.00 mg/dL Final   • Sodium 12/18/2019 137  136 - 145 mmol/L Final   • Potassium 12/18/2019 3.8  3.5 - 5.2 mmol/L Final   • Chloride 12/18/2019 101  98 - 107 mmol/L Final   • CO2 12/18/2019 25.0  22.0 - 29.0 mmol/L Final   • Calcium 12/18/2019 8.5* 8.6 - 10.5 mg/dL Final   • eGFR Non African Amer 12/18/2019 128  >60 mL/min/1.73 Final   • BUN/Creatinine Ratio 12/18/2019 21.4  7.0 - 25.0 Final   • Anion Gap 12/18/2019 11.0  5.0 - 15.0 mmol/L Final   • WBC 12/18/2019 8.83  3.40 - 10.80 10*3/mm3 Final   • RBC 12/18/2019 3.46* 3.77 - 5.28 10*6/mm3 Final   • Hemoglobin 12/18/2019 9.5* 12.0 - 15.9 g/dL Final   • Hematocrit 12/18/2019 29.2* 34.0 - 46.6 % Final   • MCV 12/18/2019 84.4  79.0 - 97.0 fL Final   • MCH 12/18/2019 27.5  26.6 - 33.0 pg Final   • MCHC 12/18/2019 32.5  31.5 - 35.7 g/dL Final   • RDW 12/18/2019 15.4  12.3 - 15.4 % Final   • RDW-SD 12/18/2019 46.4  37.0 - 54.0 fl Final   • MPV 12/18/2019 9.5  6.0 - 12.0 fL Final   • Platelets 12/18/2019 302  140 - 450 10*3/mm3 Final   • Neutrophil % 12/18/2019 72.6  42.7 - 76.0 % Final   • Lymphocyte % 12/18/2019 16.3* 19.6 - 45.3 % Final   • Monocyte % 12/18/2019 6.9  5.0 - 12.0 % Final   • Eosinophil % 12/18/2019 1.7  0.3 - 6.2 % Final   •  Basophil % 12/18/2019 0.6  0.0 - 1.5 % Final   • Immature Grans % 12/18/2019 1.9* 0.0 - 0.5 % Final   • Neutrophils, Absolute 12/18/2019 6.41  1.70 - 7.00 10*3/mm3 Final   • Lymphocytes, Absolute 12/18/2019 1.44  0.70 - 3.10 10*3/mm3 Final   • Monocytes, Absolute 12/18/2019 0.61  0.10 - 0.90 10*3/mm3 Final   • Eosinophils, Absolute 12/18/2019 0.15  0.00 - 0.40 10*3/mm3 Final   • Basophils, Absolute 12/18/2019 0.05  0.00 - 0.20 10*3/mm3 Final   • Immature Grans, Absolute 12/18/2019 0.17* 0.00 - 0.05 10*3/mm3 Final   • nRBC 12/18/2019 0.8* 0.0 - 0.2 /100 WBC Final      Adult Transthoracic Echo Limited W/ Cont if Necessary Per Protocol  · Limited study performed with bubble contrast without evidence of right   to left shunt     Adult Transthoracic Echo Limited W/ Cont if Necessary Per Protocol  · Estimated EF = 61%.  · Left ventricular systolic function is normal.  · 2D Echocardiogram Limited w/ Color. See note. The study is technically   difficult for diagnosis. The quality of the study is limited due to poor   acoustic windows related to patient positioning     XR Chest 1 View  Narrative: PROCEDURE: Single chest view portable    REASON FOR EXAM:shortness of breath    FINDINGS: Cardiac and pulmonary vasculature are normal. Lungs are  clear. Pleural spaces are normal. No acute osseous abnormality.  Impression: Negative single view chest    Electronically signed by:  Ferdinand Suarez MD  12/17/2019 7:16 AM [facility]UST  Workstation: [id number]    [email or web address]@  Immunization History   Administered Date(s) Administered   • Influenza, Unspecified 10/21/2019   • Tdap 10/21/2019   • flucelvax quad pfs =>4 YRS 10/21/2019       The following portions of the patient's history were reviewed and updated as appropriate: allergies, current medications, past family history, past medical history, past social history, past surgical history and problem list.        Physical Exam  Physical Exam  Constitutional:       Appearance: She is obese.   HENT:       Head: Normocephalic and atraumatic.      Right Ear: Tympanic membrane and ear canal normal.      Left Ear: Tympanic membrane and ear canal normal.      Nose: No congestion.   Eyes:      General:         Right eye: No discharge.         Left eye: No discharge.      Conjunctiva/sclera: Conjunctivae normal.   Neck:      Musculoskeletal: Normal range of motion. No neck rigidity or muscular tenderness.   Cardiovascular:      Rate and Rhythm: Normal rate and regular rhythm.      Pulses: Normal pulses.      Heart sounds: No murmur.   Pulmonary:      Effort: Pulmonary effort is normal. No respiratory distress.      Breath sounds: Normal breath sounds.   Abdominal:      Palpations: Abdomen is soft. There is no mass.      Tenderness: There is no abdominal tenderness. There is no right CVA tenderness, left CVA tenderness or guarding.   Musculoskeletal:      Right lower leg: Edema present.      Left lower leg: Edema present.   Lymphadenopathy:      Cervical: No cervical adenopathy.   Skin:     General: Skin is warm.      Capillary Refill: Capillary refill takes less than 2 seconds.   Neurological:      General: No focal deficit present.      Mental Status: She is alert and oriented to person, place, and time.   Psychiatric:         Mood and Affect: Mood normal.         Behavior: Behavior normal.         Assessment/Plan    Diagnosis Plan   1. Anxiety  Ambulatory Referral to Behavioral Health   2. Localized edema  Comprehensive Metabolic Panel    Urinalysis With Microscopic - Urine, Clean Catch    Urinalysis without microscopic (no culture) - Urine, Clean Catch    Urinalysis, Microscopic Only - Urine, Clean Catch   3. Weight gain  TSH    T4, free    Lipid Panel   4. Hypothyroidism, unspecified type  TSH    T4, free      Orders Placed This Encounter   Procedures   • TSH   • T4, free   • Comprehensive Metabolic Panel   • Lipid Panel   • Urinalysis without microscopic (no culture) - Urine, Clean Catch   • Urinalysis, Microscopic  Only - Urine, Clean Catch   • Ambulatory Referral to Behavioral Health     Referral Priority:   Routine     Referral Type:   Behavorial Health/Psych     Referral Reason:   Specialty Services Required     Requested Specialty:   Behavioral Health     Number of Visits Requested:   1   • Urinalysis With Microscopic - Urine, Clean Catch     Patient with anxiety, unclear if this is been precipitated by levothyroxine; will refer to behavioral health for therapy; consider BuSpar at follow-up; will reorder thyroid tests; since patient not tolerating levothyroxine and will attempt to discontinue medication unless lab results are convincingly abnormal, follow-up in 1 week; will obtain results from prior PCP which will hopefully clarify medical history.  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     Bilateral lower extremity pitting edema; likely secondary to vascular insufficiency versus endocrine abnormality; labs as above; will check for liver/kidney etiology; has had extensive cardiac work-up; normal echo/bubble study/ECG/CT PE. Does have history of ASD and PFO however reports surgery as a child and likely is no longer contributory to current symptoms.       This document has been electronically signed by Yogesh Powell MD on October 1, 2020 16:26 CDT

## 2020-10-02 LAB
ALBUMIN SERPL-MCNC: 4.5 G/DL (ref 3.5–5.2)
ALBUMIN/GLOB SERPL: 1.6 G/DL
ALP SERPL-CCNC: 80 U/L (ref 39–117)
ALT SERPL W P-5'-P-CCNC: 22 U/L (ref 1–33)
ANION GAP SERPL CALCULATED.3IONS-SCNC: 7.2 MMOL/L (ref 5–15)
AST SERPL-CCNC: 17 U/L (ref 1–32)
BACTERIA UR QL AUTO: NORMAL /HPF
BASOPHILS # BLD AUTO: 0.07 10*3/MM3 (ref 0–0.2)
BASOPHILS NFR BLD AUTO: 0.6 % (ref 0–1.5)
BILIRUB SERPL-MCNC: 0.2 MG/DL (ref 0–1.2)
BUN SERPL-MCNC: 15 MG/DL (ref 6–20)
BUN/CREAT SERPL: 17.4 (ref 7–25)
CALCIUM SPEC-SCNC: 9.2 MG/DL (ref 8.6–10.5)
CHLORIDE SERPL-SCNC: 104 MMOL/L (ref 98–107)
CHOLEST SERPL-MCNC: 183 MG/DL (ref 0–200)
CO2 SERPL-SCNC: 29.8 MMOL/L (ref 22–29)
CREAT SERPL-MCNC: 0.86 MG/DL (ref 0.57–1)
DEPRECATED RDW RBC AUTO: 40.1 FL (ref 37–54)
EOSINOPHIL # BLD AUTO: 0.15 10*3/MM3 (ref 0–0.4)
EOSINOPHIL NFR BLD AUTO: 1.4 % (ref 0.3–6.2)
ERYTHROCYTE [DISTWIDTH] IN BLOOD BY AUTOMATED COUNT: 13.6 % (ref 12.3–15.4)
GFR SERPL CREATININE-BSD FRML MDRD: 77 ML/MIN/1.73
GLOBULIN UR ELPH-MCNC: 2.9 GM/DL
GLUCOSE SERPL-MCNC: 95 MG/DL (ref 65–99)
HCT VFR BLD AUTO: 39.7 % (ref 34–46.6)
HDLC SERPL-MCNC: 48 MG/DL (ref 40–60)
HGB BLD-MCNC: 13.6 G/DL (ref 12–15.9)
HYALINE CASTS UR QL AUTO: NORMAL /LPF
IMM GRANULOCYTES # BLD AUTO: 0.05 10*3/MM3 (ref 0–0.05)
IMM GRANULOCYTES NFR BLD AUTO: 0.5 % (ref 0–0.5)
LDLC SERPL CALC-MCNC: 92 MG/DL (ref 0–100)
LDLC/HDLC SERPL: 1.92 {RATIO}
LYMPHOCYTES # BLD AUTO: 2.46 10*3/MM3 (ref 0.7–3.1)
LYMPHOCYTES NFR BLD AUTO: 22.8 % (ref 19.6–45.3)
MCH RBC QN AUTO: 28.2 PG (ref 26.6–33)
MCHC RBC AUTO-ENTMCNC: 34.3 G/DL (ref 31.5–35.7)
MCV RBC AUTO: 82.4 FL (ref 79–97)
MONOCYTES # BLD AUTO: 0.6 10*3/MM3 (ref 0.1–0.9)
MONOCYTES NFR BLD AUTO: 5.6 % (ref 5–12)
NEUTROPHILS NFR BLD AUTO: 69.1 % (ref 42.7–76)
NEUTROPHILS NFR BLD AUTO: 7.47 10*3/MM3 (ref 1.7–7)
NRBC BLD AUTO-RTO: 0.1 /100 WBC (ref 0–0.2)
PLATELET # BLD AUTO: 300 10*3/MM3 (ref 140–450)
PMV BLD AUTO: 11.8 FL (ref 6–12)
POTASSIUM SERPL-SCNC: 4.9 MMOL/L (ref 3.5–5.2)
PROT SERPL-MCNC: 7.4 G/DL (ref 6–8.5)
RBC # BLD AUTO: 4.82 10*6/MM3 (ref 3.77–5.28)
RBC # UR: NORMAL /HPF
REF LAB TEST METHOD: NORMAL
SODIUM SERPL-SCNC: 141 MMOL/L (ref 136–145)
SQUAMOUS #/AREA URNS HPF: NORMAL /HPF
T4 FREE SERPL-MCNC: 0.76 NG/DL (ref 0.93–1.7)
TRIGL SERPL-MCNC: 215 MG/DL (ref 0–150)
TSH SERPL DL<=0.05 MIU/L-ACNC: 4.02 UIU/ML (ref 0.27–4.2)
VLDLC SERPL-MCNC: 43 MG/DL (ref 5–40)
WBC # BLD AUTO: 10.8 10*3/MM3 (ref 3.4–10.8)
WBC UR QL AUTO: NORMAL /HPF

## 2020-10-05 DIAGNOSIS — R53.83 FATIGUE, UNSPECIFIED TYPE: Primary | ICD-10-CM

## 2020-10-08 ENCOUNTER — TELEMEDICINE (OUTPATIENT)
Dept: PSYCHIATRY | Facility: CLINIC | Age: 30
End: 2020-10-08

## 2020-10-08 ENCOUNTER — OFFICE VISIT (OUTPATIENT)
Dept: FAMILY MEDICINE CLINIC | Facility: CLINIC | Age: 30
End: 2020-10-08

## 2020-10-08 VITALS
BODY MASS INDEX: 42.8 KG/M2 | OXYGEN SATURATION: 100 % | HEART RATE: 64 BPM | SYSTOLIC BLOOD PRESSURE: 120 MMHG | DIASTOLIC BLOOD PRESSURE: 84 MMHG | WEIGHT: 265.2 LBS | TEMPERATURE: 97.3 F

## 2020-10-08 DIAGNOSIS — E03.9 HYPOTHYROIDISM, UNSPECIFIED TYPE: ICD-10-CM

## 2020-10-08 DIAGNOSIS — R80.9 PROTEINURIA, UNSPECIFIED TYPE: ICD-10-CM

## 2020-10-08 DIAGNOSIS — F41.1 GENERALIZED ANXIETY DISORDER: Primary | ICD-10-CM

## 2020-10-08 DIAGNOSIS — F41.9 ANXIETY: Primary | ICD-10-CM

## 2020-10-08 DIAGNOSIS — R63.5 WEIGHT GAIN: ICD-10-CM

## 2020-10-08 LAB
BILIRUB BLD-MCNC: NEGATIVE MG/DL
CLARITY, POC: CLEAR
COLOR UR: YELLOW
GLUCOSE UR STRIP-MCNC: NEGATIVE MG/DL
KETONES UR QL: NEGATIVE
LEUKOCYTE EST, POC: NEGATIVE
NITRITE UR-MCNC: NEGATIVE MG/ML
PH UR: 6 [PH] (ref 5–8)
PROT UR STRIP-MCNC: NEGATIVE MG/DL
RBC # UR STRIP: NEGATIVE /UL
SP GR UR: 1.02 (ref 1–1.03)
UROBILINOGEN UR QL: NORMAL

## 2020-10-08 PROCEDURE — 90791 PSYCH DIAGNOSTIC EVALUATION: CPT | Performed by: COUNSELOR

## 2020-10-08 PROCEDURE — 99213 OFFICE O/P EST LOW 20 MIN: CPT | Performed by: STUDENT IN AN ORGANIZED HEALTH CARE EDUCATION/TRAINING PROGRAM

## 2020-10-08 RX ORDER — BUSPIRONE HYDROCHLORIDE 7.5 MG/1
7.5 TABLET ORAL 2 TIMES DAILY
Qty: 60 TABLET | Refills: 0 | Status: SHIPPED | OUTPATIENT
Start: 2020-10-08 | End: 2020-12-10 | Stop reason: SDUPTHER

## 2020-10-08 NOTE — PROGRESS NOTES
Subjective:  Emily Dean is a 30 y.o. female who presents for Hypothyroidism f/u     Was seen last week for anxiety, fatigue, weight gain; labs unremarkable with the exception of slightly low T4; patient has resumed taking her levothyroxine; believes still makes her anxiety worse however is amenable to keep taking medication with hopes adverse effects subside; had a meeting with the behavioral health counselor today; anxiety and panic attacks still an issue; additionally gets very little sleep, works the night shift, has 3 children at home; sleeps about 4 hours a night; does not want to put her children in ; does not trust anyone else with kids; endorses good diet and exercise; admits to high stress at work        Patient Active Problem List   Diagnosis   • Maternal obesity syndrome in third trimester   • Vaginal bleeding in pregnancy, second trimester   • Pain of round ligament affecting pregnancy, antepartum   •  labor in third trimester   •  labor third trimester with  delivery third trimester   • Shortness of breath   • Anemia due to blood loss, acute   • ASD (atrial septal defect)   • PFO (patent foramen ovale)   • Peripheral edema           Current Outpatient Medications:   •  levothyroxine (SYNTHROID, LEVOTHROID) 50 MCG tablet, , Disp: , Rfl:   •  busPIRone (BUSPAR) 7.5 MG tablet, Take 1 tablet by mouth 2 (Two) Times a Day., Disp: 60 tablet, Rfl: 0    Review of Systems  Review of Systems   Constitutional: Negative for appetite change and fever.   HENT: Negative for sore throat.    Eyes: Negative for discharge and visual disturbance.   Respiratory: Negative for cough and shortness of breath.    Cardiovascular: Negative for chest pain, palpitations and leg swelling.   Gastrointestinal: Negative for abdominal pain, diarrhea and vomiting.   Genitourinary: Negative for dysuria.   Skin: Negative for rash.   Neurological: Negative for light-headedness and headaches.    Psychiatric/Behavioral: Negative for agitation and suicidal ideas.       Patient Active Problem List   Diagnosis   • Maternal obesity syndrome in third trimester   • Vaginal bleeding in pregnancy, second trimester   • Pain of round ligament affecting pregnancy, antepartum   •  labor in third trimester   •  labor third trimester with  delivery third trimester   • Shortness of breath   • Anemia due to blood loss, acute   • ASD (atrial septal defect)   • PFO (patent foramen ovale)   • Peripheral edema     Past Surgical History:   Procedure Laterality Date   • CARDIAC SURGERY     • CARDIAC VALVE SURGERY N/A     3 open vaulves at birth   •  SECTION      3 prior csections   •  SECTION WITH TUBAL N/A 2019    Procedure:  SECTION REPEAT WITH TUBAL;  Surgeon: Aashish Wren DO;  Location: [facility] LABOR DELIVERY;  Service: Obstetrics     Social History     Socioeconomic History   • Marital status: Single     Spouse name: Not on file   • Number of children: Not on file   • Years of education: Not on file   • Highest education level: Not on file   Tobacco Use   • Smoking status: Never Smoker   • Smokeless tobacco: Never Used   Substance and Sexual Activity   • Alcohol use: No     Frequency: Never   • Drug use: No   • Sexual activity: Yes     Partners: Male     Comment: last pap smear 2019 negative at  [facility]     Family History   Problem Relation Age of Onset   • COPD Mother    • Cancer Mother         lung   • Heart attack Father    • No Known Problems Sister    • Hydrocephalus Son    • ADD / ADHD Daughter    • Heart attack Maternal Grandmother    • Lung cancer Maternal Grandfather    • No Known Problems Daughter      Office Visit on 10/01/2020   Component Date Value Ref Range Status   • TSH 10/01/2020 4.020  0.270 - 4.200 uIU/mL Final   • Free T4 10/01/2020 0.76* 0.93 - 1.70 ng/dL Final   • Glucose 10/01/2020 95  65 - 99 mg/dL Final   • BUN 10/01/2020 15  6 - 20  mg/dL Final   • Creatinine 10/01/2020 0.86  0.57 - 1.00 mg/dL Final   • Sodium 10/01/2020 141  136 - 145 mmol/L Final   • Potassium 10/01/2020 4.9  3.5 - 5.2 mmol/L Final   • Chloride 10/01/2020 104  98 - 107 mmol/L Final   • CO2 10/01/2020 29.8* 22.0 - 29.0 mmol/L Final   • Calcium 10/01/2020 9.2  8.6 - 10.5 mg/dL Final   • Total Protein 10/01/2020 7.4  6.0 - 8.5 g/dL Final   • Albumin 10/01/2020 4.50  3.50 - 5.20 g/dL Final   • ALT (SGPT) 10/01/2020 22  1 - 33 U/L Final   • AST (SGOT) 10/01/2020 17  1 - 32 U/L Final   • Alkaline Phosphatase 10/01/2020 80  39 - 117 U/L Final   • Total Bilirubin 10/01/2020 0.2  0.0 - 1.2 mg/dL Final   • eGFR Non African Amer 10/01/2020 77  >60 mL/min/1.73 Final   • Globulin 10/01/2020 2.9  gm/dL Final   • A/G Ratio 10/01/2020 1.6  g/dL Final   • BUN/Creatinine Ratio 10/01/2020 17.4  7.0 - 25.0 Final   • Anion Gap 10/01/2020 7.2  5.0 - 15.0 mmol/L Final   • Total Cholesterol 10/01/2020 183  0 - 200 mg/dL Final   • Triglycerides 10/01/2020 215* 0 - 150 mg/dL Final   • HDL Cholesterol 10/01/2020 48  40 - 60 mg/dL Final   • LDL Cholesterol  10/01/2020 92  0 - 100 mg/dL Final   • VLDL Cholesterol 10/01/2020 43* 5 - 40 mg/dL Final   • LDL/HDL Ratio 10/01/2020 1.92   Final   • Color, UA 10/01/2020 Yellow  Yellow, Straw Final   • Appearance, UA 10/01/2020 Clear  Clear Final   • pH, UA 10/01/2020 6.5  5.0 - 8.0 Final   • Specific Gravity, UA 10/01/2020 1.020  1.005 - 1.030 Final   • Glucose, UA 10/01/2020 Negative  Negative Final   • Ketones, UA 10/01/2020 Trace* Negative Final   • Bilirubin, UA 10/01/2020 Small (1+)* Negative Final   • Blood, UA 10/01/2020 Negative  Negative Final   • Protein, UA 10/01/2020 Trace* Negative Final   • Leuk Esterase, UA 10/01/2020 Small (1+)* Negative Final   • Nitrite, UA 10/01/2020 Negative  Negative Final   • Urobilinogen, UA 10/01/2020 0.2 E.U./dL  0.2 - 1.0 E.U./dL Final   • RBC, UA 10/01/2020 0-2  None Seen, 0-2 /HPF Final   • WBC, UA 10/01/2020 0-2   None Seen, 0-2 /HPF Final   • Bacteria, UA 10/01/2020 None Seen  None Seen /HPF Final   • Squamous Epithelial Cells, UA 10/01/2020 0-2  None Seen, 0-2 /HPF Final   • Hyaline Casts, UA 10/01/2020 0-2  None Seen /LPF Final   • Methodology 10/01/2020 Automated Microscopy   Final      Adult Transthoracic Echo Limited W/ Cont if Necessary Per Protocol  · Limited study performed with bubble contrast without evidence of right   to left shunt     Adult Transthoracic Echo Limited W/ Cont if Necessary Per Protocol  · Estimated EF = 61%.  · Left ventricular systolic function is normal.  · 2D Echocardiogram Limited w/ Color. See note. The study is technically   difficult for diagnosis. The quality of the study is limited due to poor   acoustic windows related to patient positioning     XR Chest 1 View  Narrative: PROCEDURE: Single chest view portable    REASON FOR EXAM:shortness of breath    FINDINGS: Cardiac and pulmonary vasculature are normal. Lungs are  clear. Pleural spaces are normal. No acute osseous abnormality.  Impression: Negative single view chest    Electronically signed by:  Ferdinand Suarez MD  12/17/2019 7:16 AM [facility]  Workstation: [id number]    [email or web address]  Immunization History   Administered Date(s) Administered   • Influenza, Unspecified 10/21/2019   • Tdap 10/21/2019   • flucelvax quad pfs =>4 YRS 10/21/2019       The following portions of the patient's history were reviewed and updated as appropriate: allergies, current medications, past family history, past medical history, past social history, past surgical history and problem list.        Physical Exam  Physical Exam  Constitutional:       General: She is not in acute distress.     Appearance: Normal appearance. She is not ill-appearing.   HENT:      Head: Normocephalic and atraumatic.   Eyes:      General:         Right eye: No discharge.         Left eye: No discharge.      Conjunctiva/sclera: Conjunctivae normal.   Cardiovascular:      Rate and Rhythm: Normal  rate and regular rhythm.      Heart sounds: No murmur.   Pulmonary:      Effort: Pulmonary effort is normal. No respiratory distress.      Breath sounds: Normal breath sounds.   Abdominal:      Palpations: Abdomen is soft.      Tenderness: There is no abdominal tenderness.   Musculoskeletal:      Right lower leg: No edema.      Left lower leg: No edema.   Skin:     General: Skin is warm and dry.   Neurological:      General: No focal deficit present.      Mental Status: She is alert. Mental status is at baseline.   Psychiatric:         Mood and Affect: Mood normal.         Behavior: Behavior normal.         Assessment/Plan    Diagnosis Plan   1. Anxiety  busPIRone (BUSPAR) 7.5 MG tablet   2. Proteinuria, unspecified type     3. Weight gain     4. Hypothyroidism, unspecified type        Proteinurea was resolved on repeat UA today, reassuring;    Anxiety; continue to see behavioral health; counseled on techniques; will start BuSpar 7.5 twice daily; counseled on possible adverse effects of medication; follow-up in 2 weeks    Obesity; likely combination of stress, lack of sleep and recent pregnancy; extensively counseled on stress reduction; unfortunately patient unable to stop working the night shift; will attempt to obtain more sleep; denies issues with sleep, reassuring.    Hypothyroid; patient instructed to continue her levothyroxine; consider referral to endocrinologist if still unable to tolerate levothyroxine; patient agreeable to plan       This document has been electronically signed by Yogesh Powell MD on October 8, 2020 12:02 CDT

## 2020-10-08 NOTE — PROGRESS NOTES
"This provider is located at the [facility]), [address], [place] [address] using a secure [employer] Gate[employer] Video Visit through [employer]. Patient is being seen remotely via telehealth at [address] [place] [address] and stated they are in a secure environment for this session. The patient's condition being diagnosed/treated is appropriate for telemedicine. The provider identified herself as well as her credentials. The patient, and/or patients guardian, consent to be seen remotely, and when consent is given they understand that the consent allows for patient identifiable information to be sent to a third party as needed. They may refuse to be seen remotely at any time. The electronic data is encrypted and password protected, and the patient and/or guardian has been advised of the potential risks to privacy not withstanding such measures.     You have chosen to receive care through a telehealth visit.  Do you consent to use a video/audio connection for your medical care today? Yes    Subjective   Emily Dean is a 30 y.o. female who presents today for initial evaluation  after being referred by Dr. Kwaku Powell due to ongoing anxiety attacks. Patient reports having a panic attack 4-5 times daily. Patient describes these attacks as having difficulty breathing, increased heart rate, tremors, and \"fluttering\" in chest; Patient goes on to describes these symptoms as \"being on top of a roller-coaster feeling scared\". Patient reports that she has struggled with these symptoms since 18 after the birth of her son who later  due to medical problems at the age of 7. Patient reports that she worries obsessively about the safety or herself and her children. Patient reports she also finds herself worrying about dying and to the point that she feels physically sick when driving due to the thought that she could have a MVA at anytime. Patient reports that " "her thoughts and fears have prevented her \"happiness\" in her daily life. Patient shares an example that she can not live in the present due to obsessive thinking patterns of what could happen to her or her family members. Patient does report that caffeine, chocolate, or wine increases her anxiety.    Patient born in [place] but moved to [place] 5 years ago to help provide care to her aunt that was in bad health. Patient is currently engaged to her fiance that share a 9 month old daughter together. Patient is currently working full time as a  as well as being enrolled in college courses for criminal justice. Patient has three dependent daughters; 11 years, 4 years and 9 months old. Two oldest daughters are out of a previously relationship; Patient reports that she had no support during that relationship due to his use of substance abuse; Patient has full custody of children due to signing over his parental rights; no current communication at this time. Patient reports a family history of substance abuse describing her mother as an alcoholic stating that her childhood was neglectful and that her mother would share her childhood stories of being \"sold\", \"raped\", and \"stuff that you should never tell your children about it them a child themselves\". Patient shared that her siblings beside one are all in active addiction and is uncertain of there whereabouts. Patient reports that she has a history of being a caregiver for multiple members of her family due to the concerns that the children within their homes were not being took care of as what she believed should be. Patient reports that she tries to be the mother and caregiver that she would have liked to have had as a child herself.     Patient adamantly and convincingly denies current suicidal or homicidal ideation or perceptual disturbance.    Time: 0848  Name of PCP: Dr. Powell  Referral source: Dr. Powell     Chief Complaint: Having panic attacks and feel " "really stressed out and worry all the time. I over worry sometimes and can't shut it off.       Significant Life Events:  Has patient been through or witnessed a divorce? no      Has patient experienced a death / loss of relationship? Yes  Son  at almost 7 years old; born medical issues caused by spinal bifida born like a vegetable born in  and past in .  They put me on Klonopin at that time but didn't take it because I felt \"out of it\" and had to be there for my daughter and felt like it was covering up the fact that he was gone or something. It was making me have bad thoughts like I was trying to take something away.     - Mother passed away stage four lung cancer and COPD. I was her caregiver for two months than she passed away. I had to try and track my sisters down so we could have a proper  for her.     Has patient experienced a major accident or tragic events? Yes; death of son an death of mother     Has patient experienced any other significant life events or trauma (such as verbal, physical, sexual abuse)? No; I mean my mothe was alcoholic; had a rough childhood growing up didn't have the best. If we didn't eat at school then chances are we just wouldn't get to eat. She had boyfriends in and out. It just wasn't a stable place.     Work History:  Highest level of education obtained: 12th grade, college- currently in school for criminal justice wanting to be a      Ever been active duty in the ? 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      Social History:  Social History     Socioeconomic History   • Marital status: Single     Spouse name: Not on file   • Number of children: Not on file   • Years of education: Not on file   • Highest education level: Not on " file   Tobacco Use   • Smoking status: Never Smoker   • Smokeless tobacco: Never Used   Substance and Sexual Activity   • Alcohol use: No     Frequency: Never   • Drug use: No   • Sexual activity: Yes     Partners: Male     Comment: last pap smear 2019 negative at  [facility]     Marital Status: single    Patient's current living situation: three daughters, boyfriend, and stepfather who is sick and I'm taking care of him right now.     Support system: significant other    Difficulty getting along with peers: no    Difficulty making new friendships: no    Difficulty maintaining friendships: no- don't really have time to make friends; honestly I am picky when it comes to friends it shouldn't be just when you need a  or something like that. I think a friend is someone who will call and check on you and see how your day is stuff like that.     Close with family members: yes- well I am with one of my sisters she's like me; no drugs and is also a  but she lives in [place].     Religous: I would consider myself Latter-day but don't have time to go to [employer]      Past Medical History:  Past Medical History:   Diagnosis Date   • Congenital heart defect    • Family history of cleft lip    • Family history of congenital hydrocephalus    • History of  delivery    • History of  labor    • Thyroid dysfunction        Past Surgical History:  Past Surgical History:   Procedure Laterality Date   • CARDIAC SURGERY     • CARDIAC VALVE SURGERY N/A     3 open vaulves at birth   •  SECTION      3 prior csections   •  SECTION WITH TUBAL N/A 2019    Procedure:  SECTION REPEAT WITH TUBAL;  Surgeon: Aashish Wren DO;  Location: [facility] LABOR DELIVERY;  Service: Obstetrics       Physical Exam:   not currently breastfeeding. There is no height or weight on file to calculate BMI.     History of prior treatment or hospitalization: Denies     Are there any significant  health issues (current or past): Heart issues    History of seizures: no    Allergy:   No Known Allergies     Current Medications:   Current Outpatient Medications   Medication Sig Dispense Refill   • levothyroxine (SYNTHROID, LEVOTHROID) 50 MCG tablet        No current facility-administered medications for this visit.        Lab Results:   Office Visit on 10/01/2020   Component Date Value Ref Range Status   • TSH 10/01/2020 4.020  0.270 - 4.200 uIU/mL Final   • Free T4 10/01/2020 0.76* 0.93 - 1.70 ng/dL Final   • Glucose 10/01/2020 95  65 - 99 mg/dL Final   • BUN 10/01/2020 15  6 - 20 mg/dL Final   • Creatinine 10/01/2020 0.86  0.57 - 1.00 mg/dL Final   • Sodium 10/01/2020 141  136 - 145 mmol/L Final   • Potassium 10/01/2020 4.9  3.5 - 5.2 mmol/L Final   • Chloride 10/01/2020 104  98 - 107 mmol/L Final   • CO2 10/01/2020 29.8* 22.0 - 29.0 mmol/L Final   • Calcium 10/01/2020 9.2  8.6 - 10.5 mg/dL Final   • Total Protein 10/01/2020 7.4  6.0 - 8.5 g/dL Final   • Albumin 10/01/2020 4.50  3.50 - 5.20 g/dL Final   • ALT (SGPT) 10/01/2020 22  1 - 33 U/L Final   • AST (SGOT) 10/01/2020 17  1 - 32 U/L Final   • Alkaline Phosphatase 10/01/2020 80  39 - 117 U/L Final   • Total Bilirubin 10/01/2020 0.2  0.0 - 1.2 mg/dL Final   • eGFR Non African Amer 10/01/2020 77  >60 mL/min/1.73 Final   • Globulin 10/01/2020 2.9  gm/dL Final   • A/G Ratio 10/01/2020 1.6  g/dL Final   • BUN/Creatinine Ratio 10/01/2020 17.4  7.0 - 25.0 Final   • Anion Gap 10/01/2020 7.2  5.0 - 15.0 mmol/L Final   • Total Cholesterol 10/01/2020 183  0 - 200 mg/dL Final   • Triglycerides 10/01/2020 215* 0 - 150 mg/dL Final   • HDL Cholesterol 10/01/2020 48  40 - 60 mg/dL Final   • LDL Cholesterol  10/01/2020 92  0 - 100 mg/dL Final   • VLDL Cholesterol 10/01/2020 43* 5 - 40 mg/dL Final   • LDL/HDL Ratio 10/01/2020 1.92   Final   • Color, UA 10/01/2020 Yellow  Yellow, Straw Final   • Appearance, UA 10/01/2020 Clear  Clear Final   • pH, UA 10/01/2020 6.5  5.0 -  8.0 Final   • Specific Gravity, UA 10/01/2020 1.020  1.005 - 1.030 Final   • Glucose, UA 10/01/2020 Negative  Negative Final   • Ketones, UA 10/01/2020 Trace* Negative Final   • Bilirubin, UA 10/01/2020 Small (1+)* Negative Final   • Blood, UA 10/01/2020 Negative  Negative Final   • Protein, UA 10/01/2020 Trace* Negative Final   • Leuk Esterase, UA 10/01/2020 Small (1+)* Negative Final   • Nitrite, UA 10/01/2020 Negative  Negative Final   • Urobilinogen, UA 10/01/2020 0.2 E.U./dL  0.2 - 1.0 E.U./dL Final   • RBC, UA 10/01/2020 0-2  None Seen, 0-2 /HPF Final   • WBC, UA 10/01/2020 0-2  None Seen, 0-2 /HPF Final   • Bacteria, UA 10/01/2020 None Seen  None Seen /HPF Final   • Squamous Epithelial Cells, UA 10/01/2020 0-2  None Seen, 0-2 /HPF Final   • Hyaline Casts, UA 10/01/2020 0-2  None Seen /LPF Final   • Methodology 10/01/2020 Automated Microscopy   Final       Family History:  Family History   Problem Relation Age of Onset   • COPD Mother    • Cancer Mother         lung   • Heart attack Father    • No Known Problems Sister    • Hydrocephalus Son    • ADD / ADHD Daughter    • Heart attack Maternal Grandmother    • Lung cancer Maternal Grandfather    • No Known Problems Daughter        Problem List:  Patient Active Problem List   Diagnosis   • Maternal obesity syndrome in third trimester   • Vaginal bleeding in pregnancy, second trimester   • Pain of round ligament affecting pregnancy, antepartum   •  labor in third trimester   •  labor third trimester with  delivery third trimester   • Shortness of breath   • Anemia due to blood loss, acute   • ASD (atrial septal defect)   • PFO (patent foramen ovale)   • Peripheral edema     History of Substance Use:   Patient answered no  to experiencing two or more of the following problems related to substance use: using more than intended or over longer period than intended; difficulty quitting or cutting back use; spending a great deal of time obtaining,  using, or recovering from using; craving or strong desire or urge to use;  work and/or school problems; financial problems; family problems; using in dangerous situations; physical or mental health problems; relapse; feelings of guilt or remorse about use; times when used and/or drank alone; needing to use more in order to achieve the desired effect; illness or withdrawal when stopping or cutting back use; using to relieve or avoid getting ill or developing withdrawal symptoms; and black outs and/or memory issues when using.      SUICIDE RISK ASSESSMENT/CSSRS  1. Does patient have thoughts of suicide? no  2. Does patient have intent for suicide? no  3. Does patient have a current plan for suicide? no  4. History of suicide attempts: no  5. Family history of suicide or attempts: no  6. History of violent behaviors towards others or property or thoughts of committing suicide: no  7. History of sexual aggression toward others: no  8. Access to firearms or weapons: yes- safety     PHQ-Score Total:  PHQ-9 Total Score: 3    (Scales based on 0 - 10 with 10 being the worst)  Depression: 0 Anxiety: 5     Mental Status Exam:   Hygiene:   good  Cooperation:  Cooperative  Eye Contact:  Good  Psychomotor Behavior:  Appropriate  Affect:  Full range  Mood: normal  Hopelessness: Denies  Speech:  Normal  Thought Process:  Linear  Thought Content:  Normal and Mood congruent  Suicidal:  None  Homicidal:  None  Hallucinations:  None  Delusion:  None  Memory:  Intact  Orientation:  Person, Place, Time and Situation  Reliability:  good  Insight:  Good  Judgement:  Good  Impulse Control:  Good    Impression/Formulation:    Patient appeared alert and oriented.  Patient is voluntarily requesting to begin outpatient therapy at [facility].  Patient is receptive to assistance with maintaining a stable lifestyle.  Patient presents with history of anxiety.  Patient is agreeable to attend routine therapy  "sessions.  Patient expressed desire to maintain stability and participate in the therapeutic process.        Assessment/Plan   Diagnoses and all orders for this visit:    Generalized anxiety disorder        Visit Diagnoses:    ICD-10-CM ICD-9-CM   1. Generalized anxiety disorder  F41.1 300.02        Functional Status: Mild impairment     Prognosis: Good with Ongoing Treatment     Treatment Plan: Continue supportive psychotherapy efforts and medications as indicated. Obtain release of information for current treatment team for continuity of care as needed. Patient will adhere to medication regimen as prescribed and report any side effects. Patient will contact this office, call 911 or present to the nearest emergency room should suicidal or homicidal ideations occur.    Short Term Goals: Patient will be compliant with medication, and patient will have no significant medication related side effects.  Patient will be engaged in psychotherapy as indicated.  Patient will report subjective improvement of symptoms.    Long Term Goals: To stabilize mood and treat/improve subjective symptoms, the patient will stay out of the hospital, the patient will be at an optimal level of functioning, and the patient will take all medications as prescribed.The patient verbalized understanding and agreement with goals that were mutually set.    Crisis Plan:  Symptoms and/or behaviors to indicate a crisis: Extreme mood changes; including uncontrollable \"highs\" or euphoria    What calming techniques or other strategies will patient use to de-esclate and stay safe: slow down, breathe, visualize calming self, think it though, listen to music, change focus, take a walk    Who is one person patient can contact to assist with de-escalation? Boyfriend     If symptoms/behaviors persist, patient will present to the nearest hospital for an assessment. Advised patient of [facility] 24/7 assessment services.         This document has been " electronically signed by Julienne Corea LCSW  October 8, 2020 09:43 EDT    Part of this note may be an electronic transcription/translation of spoken language to printed text using the Dragon Dictation System.

## 2020-10-22 ENCOUNTER — OFFICE VISIT (OUTPATIENT)
Dept: FAMILY MEDICINE CLINIC | Facility: CLINIC | Age: 30
End: 2020-10-22

## 2020-10-22 ENCOUNTER — TELEMEDICINE (OUTPATIENT)
Dept: PSYCHIATRY | Facility: CLINIC | Age: 30
End: 2020-10-22

## 2020-10-22 VITALS
DIASTOLIC BLOOD PRESSURE: 76 MMHG | BODY MASS INDEX: 42.11 KG/M2 | RESPIRATION RATE: 18 BRPM | HEART RATE: 76 BPM | WEIGHT: 262 LBS | TEMPERATURE: 97.7 F | SYSTOLIC BLOOD PRESSURE: 106 MMHG | OXYGEN SATURATION: 99 % | HEIGHT: 66 IN

## 2020-10-22 DIAGNOSIS — F41.9 ANXIETY: Primary | ICD-10-CM

## 2020-10-22 DIAGNOSIS — F41.1 GENERALIZED ANXIETY DISORDER: Primary | ICD-10-CM

## 2020-10-22 DIAGNOSIS — E03.9 HYPOTHYROIDISM, UNSPECIFIED TYPE: ICD-10-CM

## 2020-10-22 PROCEDURE — 90832 PSYTX W PT 30 MINUTES: CPT | Performed by: COUNSELOR

## 2020-10-22 PROCEDURE — 99213 OFFICE O/P EST LOW 20 MIN: CPT | Performed by: STUDENT IN AN ORGANIZED HEALTH CARE EDUCATION/TRAINING PROGRAM

## 2020-10-22 NOTE — PROGRESS NOTES
Date: October 22, 2020  Time In: 0844  Time Out: 0915  This provider is located at the [facility] ([facility]), [address], [place] [address] using a secure [employer] Video Visit through [employer]. Patient is being seen remotely via telehealth at [address] [place] [address] and stated they are in a secure environment for this session. The patient's condition being diagnosed/treated is appropriate for telemedicine. The provider identified herself as well as her credentials. The patient, and/or patients guardian, consent to be seen remotely, and when consent is given they understand that the consent allows for patient identifiable information to be sent to a third party as needed. They may refuse to be seen remotely at any time. The electronic data is encrypted and password protected, and the patient and/or guardian has been advised of the potential risks to privacy not withstanding such measures.     You have chosen to receive care through a telehealth visit.  Do you consent to use a video/audio connection for your medical care today? Yes    PROGRESS NOTE  Data:  Emily Dean is a 30 y.o. female who presents today for follow up     Chief Complaint: Just always think of the worse thing and I actually visualize it.     History of Present Illness: Patient seen today for follow-up. Patient reports recent medication being prescribed by Dr. Powell being ineffective at this time. Patient reports feeling disappointed due to having an expectation that any type of medication would have provided her with some relief. Patient discussed high levels of anxiety at all times reporting constant worries related to work and personal routines and duties. Patient provided multiple examples of how her anxiety has a negative impact on her daily functioning. Patient does not appear to be able to enjoy or live in the present moment due to catastrophizing. Patient reports that  "she tries to be overly cautious and safe due to not only imaging the worst possible scenario but explains she visualizes this event happening and thinks how this scene would look including the \"aftermath\". Patient wonders if her anxiety has any correlation to her heart problems reporting that she notices an increase heart rate when anxious or in panic. Patient also reports that her mother also struggled with  anxiety and was prescribed Klonopin and Xanax. Patient reports that she was prescribed Klonopin once before when her son passed away but did not like the effects it had on her causing her to feel like a \"zombie\" and stopped the medication. Patient completed treatment plan this date. Patient hopeful to learn healthy coping methods for anxiety in future sessions.       Clinical Maneuvering/Intervention:    (Scales based on 0 - 10 with 10 being the worst)  Depression: 0 Anxiety: 10       Assisted patient in processing above session content; acknowledged and normalized patient’s thoughts, feelings, and concerns.  Rationalized patient thought process regarding irrational and rational fears.  Discussed triggers associated with patient's anxiety.  Also discussed coping skills for patient to implement such as mindfulness and grounding methods.    Allowed patient to freely discuss issues without interruption or judgment. Provided safe, confidential environment to facilitate the development of positive therapeutic relationship and encourage open, honest communication. Assisted patient in identifying risk factors which would indicate the need for higher level of care including thoughts to harm self or others and/or self-harming behavior and encouraged patient to contact this office, call 911, or present to the nearest emergency room should any of these events occur. Discussed crisis intervention services and means to access. Patient adamantly and convincingly denies current suicidal or homicidal ideation or " perceptual disturbance.    Assessment:   Assessment   Patient appears to maintain relative stability as compared to their baseline.  However, patient continues to struggle with anxiety which continues to cause impairment in important areas of functioning.  A result, they can be reasonably expected to continue to benefit from treatment and would likely be at increased risk for decompensation otherwise.    Mental Status Exam:   Hygiene:   good  Cooperation:  Cooperative  Eye Contact:  Good  Psychomotor Behavior:  Appropriate  Affect:  Appropriate  Mood: anxious  Speech:  Normal  Thought Process:  Goal directed  Thought Content:  Normal and Mood congruent  Suicidal:  None  Homicidal:  None  Hallucinations:  None  Delusion:  None  Memory:  Intact  Orientation:  Person, Place, Time and Situation  Reliability:  good  Insight:  Good  Judgement:  Fair  Impulse Control:  Fair  Physical/Medical Issues:  No        Patient's Support Network Includes:  significant other    Functional Status: Mild impairment     Progress toward goal: Not at goal    Prognosis: Good with Ongoing Treatment       Plan:    Patient will continue in individual outpatient therapy with focus on improved functioning and coping skills, maintaining stability, and avoiding decompensation and the need for higher level of care.    Patient will adhere to medication regimen as prescribed and report any side effects. Patient will contact this office, call 911 or present to the nearest emergency room should suicidal or homicidal ideations occur. Provide Cognitive Behavioral Therapy and Solution Focused Therapy to improve functioning, maintain stability, and avoid decompensation and the need for higher level of care.     Return in about 2 weeks, or earlier if symptoms worsen or fail to improve.    Therapist recommending medication management with CHONG Bautista at this clinic.       VISIT DIAGNOSIS:     ICD-10-CM ICD-9-CM   1. Generalized anxiety  disorder  F41.1 300.02             This document has been electronically signed by Julienne Corea LCSW  October 22, 2020 09:28 EDT      Part of this note may be an electronic transcription/translation of spoken language to printed text using the Dragon Dictation System.

## 2020-10-22 NOTE — TREATMENT PLAN
Multi-Disciplinary Problems (from Behavioral Health Treatment Plan)    Active Problems     Problem: Anxiety  Start Date: 10/22/20    Problem Details: The patient self-scales this problem as a 10 with 10 being the worst.        Goal Priority Start Date Expected End Date End Date    Patient will develop and implement behavioral and cognitive strategies to reduce anxiety and irrational fears. -- 10/22/20 -- --    Goal Details: Progress toward goal:  Not appropriate to rate progress toward goal since this is the initial treatment plan.        Goal Intervention Frequency Start Date End Date    Help patient explore past emotional issues in relation to present anxiety. Q2 Weeks 10/22/20 --    Intervention Details: Duration of treatment until until remission of symptoms.        Goal Intervention Frequency Start Date End Date    Help patient develop an awareness of their cognitive and physical responses to anxiety. Q2 Weeks 10/22/20 --    Intervention Details: Duration of treatment until until remission of symptoms.                           I have discussed and reviewed this treatment plan with the patient.

## 2020-10-22 NOTE — PROGRESS NOTES
Subjective:  Emily Dean is a 30 y.o. female who presents for anxiety and thyroid follow up    Hypothyroidism; was restarted on 50 mcg of Synthroid; initially was having issues with anxiety; however is now tolerating medication better.  No acute complaints, no cold intolerance, no hair loss/brittleness, no constipation.    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  Tolerating medication well, does not experience relief with medication but denies adverse effects.  Still with frequent anxiety attacks and generalized anxiety.  Denied SI/HI or A/V hallucinations.    Patient Active Problem List   Diagnosis   • Maternal obesity syndrome in third trimester   • Vaginal bleeding in pregnancy, second trimester   • Pain of round ligament affecting pregnancy, antepartum   •  labor in third trimester   •  labor third trimester with  delivery third trimester   • Shortness of breath   • Anemia due to blood loss, acute   • ASD (atrial septal defect)   • PFO (patent foramen ovale)   • Peripheral edema       Current Outpatient Medications:   •  busPIRone (BUSPAR) 7.5 MG tablet, Take 1 tablet by mouth 2 (Two) Times a Day., Disp: 60 tablet, Rfl: 0  •  levothyroxine (SYNTHROID, LEVOTHROID) 50 MCG tablet, , Disp: , Rfl:     Review of Systems  Review of Systems   Constitutional: Negative for appetite change and fever.   HENT: Negative for sore throat.    Eyes: Negative for discharge and visual disturbance.   Respiratory: Negative for cough and shortness of breath.    Cardiovascular: Negative for chest pain, palpitations and leg swelling.   Gastrointestinal: Negative for abdominal pain, diarrhea and vomiting.   Genitourinary: Negative for  dysuria.   Skin: Negative for rash.   Neurological: Negative for light-headedness and headaches.   Psychiatric/Behavioral: Negative for agitation.       Patient Active Problem List   Diagnosis   • Maternal obesity syndrome in third trimester   • Vaginal bleeding in pregnancy, second trimester   • Pain of round ligament affecting pregnancy, antepartum   •  labor in third trimester   •  labor third trimester with  delivery third trimester   • Shortness of breath   • Anemia due to blood loss, acute   • ASD (atrial septal defect)   • PFO (patent foramen ovale)   • Peripheral edema     Past Surgical History:   Procedure Laterality Date   • CARDIAC SURGERY     • CARDIAC VALVE SURGERY N/A     3 open vaulves at birth   •  SECTION      3 prior csections   •  SECTION WITH TUBAL N/A 2019    Procedure:  SECTION REPEAT WITH TUBAL;  Surgeon: Aashish Wren DO;  Location: [facility] LABOR DELIVERY;  Service: Obstetrics     Social History     Socioeconomic History   • Marital status: Single     Spouse name: Not on file   • Number of children: Not on file   • Years of education: Not on file   • Highest education level: Not on file   Tobacco Use   • Smoking status: Never Smoker   • Smokeless tobacco: Never Used   Substance and Sexual Activity   • Alcohol use: No     Frequency: Never   • Drug use: No   • Sexual activity: Yes     Partners: Male     Comment: last pap smear 2019 negative at  [facility]     Family History   Problem Relation Age of Onset   • COPD Mother    • Cancer Mother         lung   • Heart attack Father    • No Known Problems Sister    • Hydrocephalus Son    • ADD / ADHD Daughter    • Heart attack Maternal Grandmother    • Lung cancer Maternal Grandfather    • No Known Problems Daughter      Office Visit on 10/08/2020   Component Date Value Ref Range Status   • Color 10/08/2020 Yellow  Yellow, Straw, Dark Yellow, Julienne Final   • Clarity, UA 10/08/2020 Clear   Clear Final   • Specific Gravity  10/08/2020 1.025  1.005 - 1.030 Final   • pH, Urine 10/08/2020 6.0  5.0 - 8.0 Final   • Leukocytes 10/08/2020 Negative  Negative Final   • Nitrite, UA 10/08/2020 Negative  Negative Final   • Protein, POC 10/08/2020 Negative  Negative mg/dL Final   • Glucose, UA 10/08/2020 Negative  Negative, 1000 mg/dL (3+) mg/dL Final   • Ketones, UA 10/08/2020 Negative  Negative Final   • Urobilinogen, UA 10/08/2020 Normal  Normal Final   • Bilirubin 10/08/2020 Negative  Negative Final   • Blood, UA 10/08/2020 Negative  Negative Final   Office Visit on 10/01/2020   Component Date Value Ref Range Status   • TSH 10/01/2020 4.020  0.270 - 4.200 uIU/mL Final   • Free T4 10/01/2020 0.76* 0.93 - 1.70 ng/dL Final   • Glucose 10/01/2020 95  65 - 99 mg/dL Final   • BUN 10/01/2020 15  6 - 20 mg/dL Final   • Creatinine 10/01/2020 0.86  0.57 - 1.00 mg/dL Final   • Sodium 10/01/2020 141  136 - 145 mmol/L Final   • Potassium 10/01/2020 4.9  3.5 - 5.2 mmol/L Final   • Chloride 10/01/2020 104  98 - 107 mmol/L Final   • CO2 10/01/2020 29.8* 22.0 - 29.0 mmol/L Final   • Calcium 10/01/2020 9.2  8.6 - 10.5 mg/dL Final   • Total Protein 10/01/2020 7.4  6.0 - 8.5 g/dL Final   • Albumin 10/01/2020 4.50  3.50 - 5.20 g/dL Final   • ALT (SGPT) 10/01/2020 22  1 - 33 U/L Final   • AST (SGOT) 10/01/2020 17  1 - 32 U/L Final   • Alkaline Phosphatase 10/01/2020 80  39 - 117 U/L Final   • Total Bilirubin 10/01/2020 0.2  0.0 - 1.2 mg/dL Final   • eGFR Non African Amer 10/01/2020 77  >60 mL/min/1.73 Final   • Globulin 10/01/2020 2.9  gm/dL Final   • A/G Ratio 10/01/2020 1.6  g/dL Final   • BUN/Creatinine Ratio 10/01/2020 17.4  7.0 - 25.0 Final   • Anion Gap 10/01/2020 7.2  5.0 - 15.0 mmol/L Final   • Total Cholesterol 10/01/2020 183  0 - 200 mg/dL Final   • Triglycerides 10/01/2020 215* 0 - 150 mg/dL Final   • HDL Cholesterol 10/01/2020 48  40 - 60 mg/dL Final   • LDL Cholesterol  10/01/2020 92  0 - 100 mg/dL Final   • VLDL  Cholesterol 10/01/2020 43* 5 - 40 mg/dL Final   • LDL/HDL Ratio 10/01/2020 1.92   Final   • Color, UA 10/01/2020 Yellow  Yellow, Straw Final   • Appearance, UA 10/01/2020 Clear  Clear Final   • pH, UA 10/01/2020 6.5  5.0 - 8.0 Final   • Specific Gravity, UA 10/01/2020 1.020  1.005 - 1.030 Final   • Glucose, UA 10/01/2020 Negative  Negative Final   • Ketones, UA 10/01/2020 Trace* Negative Final   • Bilirubin, UA 10/01/2020 Small (1+)* Negative Final   • Blood, UA 10/01/2020 Negative  Negative Final   • Protein, UA 10/01/2020 Trace* Negative Final   • Leuk Esterase, UA 10/01/2020 Small (1+)* Negative Final   • Nitrite, UA 10/01/2020 Negative  Negative Final   • Urobilinogen, UA 10/01/2020 0.2 E.U./dL  0.2 - 1.0 E.U./dL Final   • RBC, UA 10/01/2020 0-2  None Seen, 0-2 /HPF Final   • WBC, UA 10/01/2020 0-2  None Seen, 0-2 /HPF Final   • Bacteria, UA 10/01/2020 None Seen  None Seen /HPF Final   • Squamous Epithelial Cells, UA 10/01/2020 0-2  None Seen, 0-2 /HPF Final   • Hyaline Casts, UA 10/01/2020 0-2  None Seen /LPF Final   • Methodology 10/01/2020 Automated Microscopy   Final      Adult Transthoracic Echo Limited W/ Cont if Necessary Per Protocol  · Limited study performed with bubble contrast without evidence of right   to left shunt     Adult Transthoracic Echo Limited W/ Cont if Necessary Per Protocol  · Estimated EF = 61%.  · Left ventricular systolic function is normal.  · 2D Echocardiogram Limited w/ Color. See note. The study is technically   difficult for diagnosis. The quality of the study is limited due to poor   acoustic windows related to patient positioning     XR Chest 1 View  Narrative: PROCEDURE: Single chest view portable    REASON FOR EXAM:shortness of breath    FINDINGS: Cardiac and pulmonary vasculature are normal. Lungs are  clear. Pleural spaces are normal. No acute osseous abnormality.  Impression: Negative single view chest    Electronically signed by:  Ferdinand Suarez MD  12/17/2019 7:16 AM  [facility]  Workstation: [id number]    @[email or web address]  Immunization History   Administered Date(s) Administered   • Influenza, Unspecified 10/21/2019   • Tdap 10/21/2019   • flucelvax quad pfs =>4 YRS 10/21/2019       The following portions of the patient's history were reviewed and updated as appropriate: allergies, current medications, past family history, past medical history, past social history, past surgical history and problem list.        Physical Exam  Physical Exam  Constitutional:       Appearance: Normal appearance.   HENT:      Head: Normocephalic and atraumatic.      Right Ear: Tympanic membrane and ear canal normal.      Left Ear: Tympanic membrane and ear canal normal.   Eyes:      General:         Right eye: No discharge.         Left eye: No discharge.      Conjunctiva/sclera: Conjunctivae normal.   Neck:      Musculoskeletal: Normal range of motion.   Cardiovascular:      Rate and Rhythm: Normal rate and regular rhythm.      Pulses: Normal pulses.      Heart sounds: Normal heart sounds. No murmur.   Pulmonary:      Effort: Pulmonary effort is normal. No respiratory distress.      Breath sounds: Normal breath sounds.   Abdominal:      General: There is no distension.      Palpations: Abdomen is soft.      Tenderness: There is no abdominal tenderness.   Lymphadenopathy:      Cervical: No cervical adenopathy.   Neurological:      Mental Status: She is alert. Mental status is at baseline.   Psychiatric:         Mood and Affect: Mood normal.         Behavior: Behavior normal.         Assessment/Plan    Diagnosis Plan   1. Anxiety     2. Hypothyroidism, unspecified type        Anxiety; counseled patient on possibility of starting new medication versus increasing BuSpar; patient reports that she still has plenty of medication left; advised to double the dose to 15 mg twice daily with daily max of 60 mg daily.  If still without relief consider different medication at follow-up; patient does not want medication to  cause weight gain, will avoid paroxetine.  Continue with therapy, encouraged.    Hypothyroidism; patient doing well with 50 mcg levothyroxine.  We will continue and recheck TSH in 4 weeks.  Will likely need to increase medication, patient aware.  Continue to encourage weight loss, exercise and healthy diet.        This document has been electronically signed by Yogesh Powell MD on October 22, 2020 16:41 CDT

## 2020-11-02 ENCOUNTER — OFFICE VISIT (OUTPATIENT)
Dept: FAMILY MEDICINE CLINIC | Facility: CLINIC | Age: 30
End: 2020-11-02

## 2020-11-02 ENCOUNTER — LAB (OUTPATIENT)
Dept: LAB | Facility: HOSPITAL | Age: 30
End: 2020-11-02

## 2020-11-02 VITALS
WEIGHT: 262 LBS | HEIGHT: 66 IN | OXYGEN SATURATION: 99 % | HEART RATE: 79 BPM | TEMPERATURE: 97.9 F | SYSTOLIC BLOOD PRESSURE: 120 MMHG | RESPIRATION RATE: 18 BRPM | DIASTOLIC BLOOD PRESSURE: 82 MMHG | BODY MASS INDEX: 42.11 KG/M2

## 2020-11-02 DIAGNOSIS — J02.9 SORE THROAT: Primary | ICD-10-CM

## 2020-11-02 DIAGNOSIS — J02.9 SORETHROAT: Primary | ICD-10-CM

## 2020-11-02 LAB
EXPIRATION DATE: NORMAL
EXPIRATION DATE: NORMAL
FLUAV AG NPH QL: NEGATIVE
FLUBV AG NPH QL: NEGATIVE
INTERNAL CONTROL: NORMAL
INTERNAL CONTROL: NORMAL
Lab: NORMAL
Lab: NORMAL
S PYO AG THROAT QL: NEGATIVE

## 2020-11-02 PROCEDURE — 87880 STREP A ASSAY W/OPTIC: CPT | Performed by: STUDENT IN AN ORGANIZED HEALTH CARE EDUCATION/TRAINING PROGRAM

## 2020-11-02 PROCEDURE — 87804 INFLUENZA ASSAY W/OPTIC: CPT | Performed by: STUDENT IN AN ORGANIZED HEALTH CARE EDUCATION/TRAINING PROGRAM

## 2020-11-02 PROCEDURE — U0003 INFECTIOUS AGENT DETECTION BY NUCLEIC ACID (DNA OR RNA); SEVERE ACUTE RESPIRATORY SYNDROME CORONAVIRUS 2 (SARS-COV-2) (CORONAVIRUS DISEASE [COVID-19]), AMPLIFIED PROBE TECHNIQUE, MAKING USE OF HIGH THROUGHPUT TECHNOLOGIES AS DESCRIBED BY CMS-2020-01-R: HCPCS | Performed by: STUDENT IN AN ORGANIZED HEALTH CARE EDUCATION/TRAINING PROGRAM

## 2020-11-02 PROCEDURE — 99213 OFFICE O/P EST LOW 20 MIN: CPT | Performed by: STUDENT IN AN ORGANIZED HEALTH CARE EDUCATION/TRAINING PROGRAM

## 2020-11-02 NOTE — PROGRESS NOTES
Subjective:  Emily Dean is a 30 y.o. female who presents for nausea, vomiting, sore throat.    Patient reports onset of symptoms approximately 3 days ago; reports that daughter had a likely Covid exposure; daughter is asymptomatic however her gymnastics teacher tested positive for Covid as well as several kids on her bus; has sore throat, nausea, vomiting.  Had a T-max yesterday of 101.3 Fahrenheit, fever improved with Tylenol/ibuprofen.  No chest pain, shortness of breath, dizziness; is able to keep down fluids.  Patient identified go to urgent care today however even with appointment was unable to be seen.  Is inquiring if she is able to go to work tonight, would like rapid test.      Patient Active Problem List   Diagnosis   • Maternal obesity syndrome in third trimester   • Vaginal bleeding in pregnancy, second trimester   • Pain of round ligament affecting pregnancy, antepartum   •  labor in third trimester   •  labor third trimester with  delivery third trimester   • Shortness of breath   • Anemia due to blood loss, acute   • ASD (atrial septal defect)   • PFO (patent foramen ovale)   • Peripheral edema         Current Outpatient Medications:   •  busPIRone (BUSPAR) 7.5 MG tablet, Take 1 tablet by mouth 2 (Two) Times a Day., Disp: 60 tablet, Rfl: 0  •  levothyroxine (SYNTHROID, LEVOTHROID) 50 MCG tablet, , Disp: , Rfl:     Review of Systems  Review of Systems   Constitutional: Positive for chills, fatigue and fever. Negative for appetite change and unexpected weight change.   HENT: Positive for rhinorrhea and sore throat.    Eyes: Negative for discharge and visual disturbance.   Respiratory: Positive for cough. Negative for shortness of breath.    Cardiovascular: Negative for chest pain, palpitations and leg swelling.   Gastrointestinal: Positive for nausea and vomiting. Negative for abdominal pain and diarrhea.   Genitourinary: Negative for dysuria.   Musculoskeletal: Negative for  neck pain and neck stiffness.   Skin: Negative for rash.   Neurological: Negative for light-headedness and headaches.   Psychiatric/Behavioral: Negative for agitation.       Patient Active Problem List   Diagnosis   • Maternal obesity syndrome in third trimester   • Vaginal bleeding in pregnancy, second trimester   • Pain of round ligament affecting pregnancy, antepartum   •  labor in third trimester   •  labor third trimester with  delivery third trimester   • Shortness of breath   • Anemia due to blood loss, acute   • ASD (atrial septal defect)   • PFO (patent foramen ovale)   • Peripheral edema     Past Surgical History:   Procedure Laterality Date   • CARDIAC SURGERY     • CARDIAC VALVE SURGERY N/A     3 open vaulves at birth   •  SECTION      3 prior csections   •  SECTION WITH TUBAL N/A 2019    Procedure:  SECTION REPEAT WITH TUBAL;  Surgeon: Aashish Wren DO;  Location: [facility] LABOR DELIVERY;  Service: Obstetrics     Social History     Socioeconomic History   • Marital status: Single     Spouse name: Not on file   • Number of children: Not on file   • Years of education: Not on file   • Highest education level: Not on file   Tobacco Use   • Smoking status: Never Smoker   • Smokeless tobacco: Never Used   Substance and Sexual Activity   • Alcohol use: No     Frequency: Never   • Drug use: No   • Sexual activity: Yes     Partners: Male     Comment: last pap smear 2019 negative at  [facility]     Family History   Problem Relation Age of Onset   • COPD Mother    • Cancer Mother         lung   • Heart attack Father    • No Known Problems Sister    • Hydrocephalus Son    • ADD / ADHD Daughter    • Heart attack Maternal Grandmother    • Lung cancer Maternal Grandfather    • No Known Problems Daughter      Office Visit on 10/08/2020   Component Date Value Ref Range Status   • Color 10/08/2020 Yellow  Yellow, Straw, Dark Yellow, Julienne Final   • Clarity, UA  10/08/2020 Clear  Clear Final   • Specific Gravity  10/08/2020 1.025  1.005 - 1.030 Final   • pH, Urine 10/08/2020 6.0  5.0 - 8.0 Final   • Leukocytes 10/08/2020 Negative  Negative Final   • Nitrite, UA 10/08/2020 Negative  Negative Final   • Protein, POC 10/08/2020 Negative  Negative mg/dL Final   • Glucose, UA 10/08/2020 Negative  Negative, 1000 mg/dL (3+) mg/dL Final   • Ketones, UA 10/08/2020 Negative  Negative Final   • Urobilinogen, UA 10/08/2020 Normal  Normal Final   • Bilirubin 10/08/2020 Negative  Negative Final   • Blood, UA 10/08/2020 Negative  Negative Final   Office Visit on 10/01/2020   Component Date Value Ref Range Status   • TSH 10/01/2020 4.020  0.270 - 4.200 uIU/mL Final   • Free T4 10/01/2020 0.76* 0.93 - 1.70 ng/dL Final   • Glucose 10/01/2020 95  65 - 99 mg/dL Final   • BUN 10/01/2020 15  6 - 20 mg/dL Final   • Creatinine 10/01/2020 0.86  0.57 - 1.00 mg/dL Final   • Sodium 10/01/2020 141  136 - 145 mmol/L Final   • Potassium 10/01/2020 4.9  3.5 - 5.2 mmol/L Final   • Chloride 10/01/2020 104  98 - 107 mmol/L Final   • CO2 10/01/2020 29.8* 22.0 - 29.0 mmol/L Final   • Calcium 10/01/2020 9.2  8.6 - 10.5 mg/dL Final   • Total Protein 10/01/2020 7.4  6.0 - 8.5 g/dL Final   • Albumin 10/01/2020 4.50  3.50 - 5.20 g/dL Final   • ALT (SGPT) 10/01/2020 22  1 - 33 U/L Final   • AST (SGOT) 10/01/2020 17  1 - 32 U/L Final   • Alkaline Phosphatase 10/01/2020 80  39 - 117 U/L Final   • Total Bilirubin 10/01/2020 0.2  0.0 - 1.2 mg/dL Final   • eGFR Non African Amer 10/01/2020 77  >60 mL/min/1.73 Final   • Globulin 10/01/2020 2.9  gm/dL Final   • A/G Ratio 10/01/2020 1.6  g/dL Final   • BUN/Creatinine Ratio 10/01/2020 17.4  7.0 - 25.0 Final   • Anion Gap 10/01/2020 7.2  5.0 - 15.0 mmol/L Final   • Total Cholesterol 10/01/2020 183  0 - 200 mg/dL Final   • Triglycerides 10/01/2020 215* 0 - 150 mg/dL Final   • HDL Cholesterol 10/01/2020 48  40 - 60 mg/dL Final   • LDL Cholesterol  10/01/2020 92  0 - 100 mg/dL  Final   • VLDL Cholesterol 10/01/2020 43* 5 - 40 mg/dL Final   • LDL/HDL Ratio 10/01/2020 1.92   Final   • Color, UA 10/01/2020 Yellow  Yellow, Straw Final   • Appearance, UA 10/01/2020 Clear  Clear Final   • pH, UA 10/01/2020 6.5  5.0 - 8.0 Final   • Specific Gravity, UA 10/01/2020 1.020  1.005 - 1.030 Final   • Glucose, UA 10/01/2020 Negative  Negative Final   • Ketones, UA 10/01/2020 Trace* Negative Final   • Bilirubin, UA 10/01/2020 Small (1+)* Negative Final   • Blood, UA 10/01/2020 Negative  Negative Final   • Protein, UA 10/01/2020 Trace* Negative Final   • Leuk Esterase, UA 10/01/2020 Small (1+)* Negative Final   • Nitrite, UA 10/01/2020 Negative  Negative Final   • Urobilinogen, UA 10/01/2020 0.2 E.U./dL  0.2 - 1.0 E.U./dL Final   • RBC, UA 10/01/2020 0-2  None Seen, 0-2 /HPF Final   • WBC, UA 10/01/2020 0-2  None Seen, 0-2 /HPF Final   • Bacteria, UA 10/01/2020 None Seen  None Seen /HPF Final   • Squamous Epithelial Cells, UA 10/01/2020 0-2  None Seen, 0-2 /HPF Final   • Hyaline Casts, UA 10/01/2020 0-2  None Seen /LPF Final   • Methodology 10/01/2020 Automated Microscopy   Final      Adult Transthoracic Echo Limited W/ Cont if Necessary Per Protocol  · Limited study performed with bubble contrast without evidence of right   to left shunt     Adult Transthoracic Echo Limited W/ Cont if Necessary Per Protocol  · Estimated EF = 61%.  · Left ventricular systolic function is normal.  · 2D Echocardiogram Limited w/ Color. See note. The study is technically   difficult for diagnosis. The quality of the study is limited due to poor   acoustic windows related to patient positioning     XR Chest 1 View  Narrative: PROCEDURE: Single chest view portable    REASON FOR EXAM:shortness of breath    FINDINGS: Cardiac and pulmonary vasculature are normal. Lungs are  clear. Pleural spaces are normal. No acute osseous abnormality.  Impression: Negative single view chest    Electronically signed by:  Ferdinand Suarez MD  12/17/2019  7:16 AM [facility]  Workstation: [id number]    [unfilled]  Immunization History   Administered Date(s) Administered   • Influenza, Unspecified 10/21/2019   • Tdap 10/21/2019   • flucelvax quad pfs =>4 YRS 10/21/2019       The following portions of the patient's history were reviewed and updated as appropriate: allergies, current medications, past family history, past medical history, past social history, past surgical history and problem list.        Physical Exam  Physical Exam  Constitutional:       Appearance: Normal appearance. She is obese.   HENT:      Head: Normocephalic and atraumatic.      Right Ear: External ear normal.      Left Ear: External ear normal.      Nose: Congestion and rhinorrhea present.      Mouth/Throat:      Mouth: Mucous membranes are moist.      Pharynx: Oropharynx is clear. Posterior oropharyngeal erythema present. No oropharyngeal exudate.   Eyes:      General:         Right eye: No discharge.         Left eye: No discharge.      Conjunctiva/sclera: Conjunctivae normal.   Neck:      Musculoskeletal: Normal range of motion.   Cardiovascular:      Rate and Rhythm: Normal rate and regular rhythm.      Pulses: Normal pulses.      Heart sounds: Normal heart sounds. No murmur.   Pulmonary:      Effort: Pulmonary effort is normal. No respiratory distress.      Breath sounds: Normal breath sounds.   Abdominal:      General: There is no distension.      Palpations: Abdomen is soft.      Tenderness: There is no abdominal tenderness.   Lymphadenopathy:      Cervical: No cervical adenopathy.   Neurological:      Mental Status: She is alert. Mental status is at baseline.   Psychiatric:         Mood and Affect: Mood normal.         Behavior: Behavior normal.         Assessment/Plan    Diagnosis Plan   1. Sorethroat  POCT Influenza A/B    POCT rapid strep A    COVID-19, [facility] IN-HOUSE, NP SWAB IN TRANSPORT MEDIA 8-10 HR TAT - Swab, Oropharynx      Orders Placed This Encounter   Procedures   • COVID-19, [facility] IN-HOUSE, NP SWAB IN TRANSPORT MEDIA 8-10 HR TAT - Swab, Oropharynx     Standing Status:   Future     Number of Occurrences:   1     Standing Expiration Date:   11/2/2021     Order Specific Question:   Previously tested for COVID-19?     Answer:   No     Order Specific Question:   Employed in healthcare setting?     Answer:   No     Order Specific Question:   Symptomatic for COVID-19 as defined by CDC?     Answer:   Yes     Order Specific Question:   Date of Symptom Onset     Answer:   11/1/2020     Order Specific Question:   Hospitalized for COVID-19?     Answer:   No     Order Specific Question:   Admitted to ICU for COVID-19?     Answer:   No     Order Specific Question:   Resident in a congregate (group) care setting?     Answer:   No     Order Specific Question:   Pregnant?     Answer:   No   • POCT Influenza A/B   • POCT rapid strep A     Viral URI; negative influenza and strep today; will obtain Covid testing; recommend stay out of work until results; patient voiced understanding.    Anxiety; was recently increased on BuSpar, patient states that it is helping drastically, no adverse effects, tolerating well.  Denied SI/HI.  Follow-up in 1 month.      This document has been electronically signed by Yogesh Powell MD on November 2, 2020 16:35 CST

## 2020-11-03 LAB — SARS-COV-2 RNA RESP QL NAA+PROBE: NOT DETECTED

## 2020-11-04 ENCOUNTER — TELEPHONE (OUTPATIENT)
Dept: FAMILY MEDICINE CLINIC | Facility: CLINIC | Age: 30
End: 2020-11-04

## 2020-12-10 DIAGNOSIS — F41.9 ANXIETY: ICD-10-CM

## 2020-12-10 NOTE — TELEPHONE ENCOUNTER
Caller: Emily Dean    Relationship: Self    Best call back number: [phone]    Medication needed:   Requested Prescriptions     Pending Prescriptions Disp Refills   • busPIRone (BUSPAR) 7.5 MG tablet 60 tablet 0     Sig: Take 1 tablet by mouth 2 (Two) Times a Day.       What is the patient's preferred pharmacy: [facility] - [address].  [phone]  - [phone] FX

## 2020-12-11 ENCOUNTER — LAB (OUTPATIENT)
Dept: LAB | Facility: HOSPITAL | Age: 30
End: 2020-12-11

## 2020-12-11 ENCOUNTER — TELEPHONE (OUTPATIENT)
Dept: FAMILY MEDICINE CLINIC | Facility: CLINIC | Age: 30
End: 2020-12-11

## 2020-12-11 DIAGNOSIS — R53.83 FATIGUE, UNSPECIFIED TYPE: ICD-10-CM

## 2020-12-11 LAB — CORTIS AM PEAK SERPL-MCNC: 6.27 MCG/DL

## 2020-12-11 PROCEDURE — 82024 ASSAY OF ACTH: CPT

## 2020-12-11 PROCEDURE — 82533 TOTAL CORTISOL: CPT

## 2020-12-11 RX ORDER — BUSPIRONE HYDROCHLORIDE 7.5 MG/1
7.5 TABLET ORAL 2 TIMES DAILY
Qty: 60 TABLET | Refills: 0 | Status: SHIPPED | OUTPATIENT
Start: 2020-12-11

## 2020-12-11 NOTE — TELEPHONE ENCOUNTER
"Pt is wanting to know if she needs to up her meds? \"Last time I the full amount I felt like I was having a heart attack. 75mg is too much for my body at one time, My friends said it's probably time to increase my dose.\"   "

## 2020-12-11 NOTE — TELEPHONE ENCOUNTER
Patient called and stated that she had been in this morning for labs and wanted to speak to provider regarding the labs and medication changes including thyroid meds.    levothyroxine (SYNTHROID, LEVOTHROID) 50 MCG tablet      [phone]

## 2020-12-14 LAB — ACTH PLAS-MCNC: 26 PG/ML (ref 7.2–63.3)

## 2020-12-15 ENCOUNTER — TELEPHONE (OUTPATIENT)
Dept: FAMILY MEDICINE CLINIC | Facility: CLINIC | Age: 30
End: 2020-12-15

## 2020-12-15 DIAGNOSIS — E03.9 HYPOTHYROIDISM, UNSPECIFIED TYPE: Primary | ICD-10-CM

## 2020-12-15 NOTE — TELEPHONE ENCOUNTER
Caller: Emily Dean    Relationship: Self    Best call back number: [phone] (H)    Caller requesting test results: PATIENT    What test was performed: THYROID    When was the test performed: 12/10/2020    Where was the test performed: IN OFFICE    Additional notes:WOULD LIKE A CALL BACK

## 2020-12-22 ENCOUNTER — LAB (OUTPATIENT)
Dept: LAB | Facility: HOSPITAL | Age: 30
End: 2020-12-22

## 2020-12-22 DIAGNOSIS — E03.9 HYPOTHYROIDISM, UNSPECIFIED TYPE: ICD-10-CM

## 2020-12-22 LAB
T4 FREE SERPL-MCNC: 0.97 NG/DL (ref 0.93–1.7)
TSH SERPL DL<=0.05 MIU/L-ACNC: 3.21 UIU/ML (ref 0.27–4.2)

## 2020-12-22 PROCEDURE — 84443 ASSAY THYROID STIM HORMONE: CPT

## 2020-12-22 PROCEDURE — 84439 ASSAY OF FREE THYROXINE: CPT

## 2020-12-28 ENCOUNTER — TELEPHONE (OUTPATIENT)
Dept: FAMILY MEDICINE CLINIC | Facility: CLINIC | Age: 30
End: 2020-12-28

## 2020-12-28 NOTE — TELEPHONE ENCOUNTER
Caller: Emily Dean    Relationship: Self    Best call back number: [phone]    Caller requesting test results: SELF    What test was performed: LAB WORK     When was the test performed: LAST WEEK

## 2020-12-29 ENCOUNTER — TELEPHONE (OUTPATIENT)
Dept: FAMILY MEDICINE CLINIC | Facility: CLINIC | Age: 30
End: 2020-12-29

## 2020-12-29 NOTE — TELEPHONE ENCOUNTER
----- Message from Yogesh Powell MD sent at 12/29/2020 11:10 AM CST -----  Please inform patient that thyroid levels are within normal range, and that no adjustment in medication is necessary.

## 2021-04-30 RX ORDER — LEVOTHYROXINE SODIUM 0.05 MG/1
50 TABLET ORAL DAILY
Qty: 60 TABLET | Refills: 0 | Status: SHIPPED | OUTPATIENT
Start: 2021-04-30
